# Patient Record
Sex: MALE | Race: WHITE | Employment: OTHER | URBAN - METROPOLITAN AREA
[De-identification: names, ages, dates, MRNs, and addresses within clinical notes are randomized per-mention and may not be internally consistent; named-entity substitution may affect disease eponyms.]

---

## 2017-01-06 ENCOUNTER — OFFICE VISIT (OUTPATIENT)
Dept: DERMATOLOGY CLINIC | Facility: CLINIC | Age: 72
End: 2017-01-06

## 2017-01-06 DIAGNOSIS — L82.1 OTHER SEBORRHEIC KERATOSIS: ICD-10-CM

## 2017-01-06 DIAGNOSIS — L57.0 AK (ACTINIC KERATOSIS): Primary | ICD-10-CM

## 2017-01-06 DIAGNOSIS — D23.60 BENIGN NEOPLASM OF SKIN OF UPPER LIMB, INCLUDING SHOULDER, UNSPECIFIED LATERALITY: ICD-10-CM

## 2017-01-06 DIAGNOSIS — D23.5 BENIGN NEOPLASM OF SKIN OF TRUNK, EXCEPT SCROTUM: ICD-10-CM

## 2017-01-06 DIAGNOSIS — D23.4 BENIGN NEOPLASM OF SCALP AND SKIN OF NECK: ICD-10-CM

## 2017-01-06 DIAGNOSIS — Z85.828 HISTORY OF SKIN CANCER: ICD-10-CM

## 2017-01-06 DIAGNOSIS — D23.30 BENIGN NEOPLASM OF SKIN OF FACE: ICD-10-CM

## 2017-01-06 PROCEDURE — 17000 DESTRUCT PREMALG LESION: CPT | Performed by: DERMATOLOGY

## 2017-01-06 PROCEDURE — 17003 DESTRUCT PREMALG LES 2-14: CPT | Performed by: DERMATOLOGY

## 2017-01-06 PROCEDURE — 99213 OFFICE O/P EST LOW 20 MIN: CPT | Performed by: DERMATOLOGY

## 2017-01-29 NOTE — PROGRESS NOTES
Tricia Benitez is a 80year old male. HPI:     CC:  Patient presents with:  Full Skin Exam: Pt presents today for a full body skin evaluation. Has personal history of BCC and Actinic Keratosis. LOV 08/05/15.          Allergies:  Pollen; Tramadol    HIS hydrocelectomy 2010   • Poison ivy    • Left eye injury      2015    • Skin cancer, basal cell      R chest 1995; L lat canthus 1997         Past Surgical History    COLECTOMY  2000    CHEMOTHERAPY  2000    HERNIA SURGERY         Social History   Marital S palms.     Multiple light to medium brown, well marginated, uniformly pigmented, macules and papules 6 mm and less scattered on exam. pigmented lesions examined with dermoscopy benign-appearing patterns.      Waxy tannish keratotic papules scattered, cherry

## 2017-09-18 ENCOUNTER — OFFICE VISIT (OUTPATIENT)
Dept: INTERNAL MEDICINE CLINIC | Facility: CLINIC | Age: 72
End: 2017-09-18

## 2017-09-18 VITALS
OXYGEN SATURATION: 97 % | HEIGHT: 76 IN | TEMPERATURE: 98 F | BODY MASS INDEX: 24.38 KG/M2 | SYSTOLIC BLOOD PRESSURE: 114 MMHG | HEART RATE: 65 BPM | DIASTOLIC BLOOD PRESSURE: 76 MMHG | WEIGHT: 200.19 LBS

## 2017-09-18 DIAGNOSIS — E78.1 PURE HYPERGLYCERIDEMIA: Primary | ICD-10-CM

## 2017-09-18 DIAGNOSIS — E55.9 VITAMIN D DEFICIENCY: ICD-10-CM

## 2017-09-18 DIAGNOSIS — K58.2 IRRITABLE BOWEL SYNDROME WITH BOTH CONSTIPATION AND DIARRHEA: ICD-10-CM

## 2017-09-18 DIAGNOSIS — Z12.5 SCREENING PSA (PROSTATE SPECIFIC ANTIGEN): ICD-10-CM

## 2017-09-18 DIAGNOSIS — L57.0 ACTINIC KERATOSIS: ICD-10-CM

## 2017-09-18 DIAGNOSIS — R10.13 EPIGASTRIC PAIN: ICD-10-CM

## 2017-09-18 DIAGNOSIS — C18.9 MALIGNANT NEOPLASM OF COLON, UNSPECIFIED PART OF COLON (HCC): ICD-10-CM

## 2017-09-18 DIAGNOSIS — Z00.00 PHYSICAL EXAM: ICD-10-CM

## 2017-09-18 DIAGNOSIS — Z87.19 HISTORY OF GASTRIC POLYP: ICD-10-CM

## 2017-09-18 PROCEDURE — G0463 HOSPITAL OUTPT CLINIC VISIT: HCPCS | Performed by: INTERNAL MEDICINE

## 2017-09-18 PROCEDURE — 99214 OFFICE O/P EST MOD 30 MIN: CPT | Performed by: INTERNAL MEDICINE

## 2017-09-18 RX ORDER — SIMVASTATIN 20 MG
10 TABLET ORAL NIGHTLY
Qty: 45 TABLET | Refills: 3 | Status: SHIPPED | OUTPATIENT
Start: 2017-09-18 | End: 2018-10-14

## 2017-09-18 NOTE — PROGRESS NOTES
HPI:   Ova Genre is a 71year old male who presents for complains of: Patient presents with:  Abdominal Pain: Patient complains of pain to middle of stomach. He feels it about 2-4 hours after eating. He reports he feels a lot gas.  He reports caitlin Alex Bah is a 71year old male had concerns including Abdominal Pain (Patient complains of pain to middle of stomach. He feels it about 2-4 hours after eating. He reports he feels a lot gas. He reports normal bms.  Patient reports the problem ha

## 2017-09-18 NOTE — PATIENT INSTRUCTIONS
1. Pure hyperglyceridemia  Cont meds and labs as discussed  - simvastatin 20 MG Oral Tab; Take 0.5 tablets (10 mg total) by mouth nightly. Dispense: 45 tablet; Refill: 3    2.  Malignant neoplasm of colon, unspecified part of colon ([facility]  See dr Jacklyn Styles  - Caryle Shuck

## 2017-10-04 PROBLEM — Z85.038 HISTORY OF COLON CANCER: Status: ACTIVE | Noted: 2017-10-04

## 2017-10-05 ENCOUNTER — LAB ENCOUNTER (OUTPATIENT)
Dept: LAB | Facility: HOSPITAL | Age: 72
End: 2017-10-05
Attending: INTERNAL MEDICINE
Payer: MEDICARE

## 2017-10-05 DIAGNOSIS — E55.9 VITAMIN D DEFICIENCY: ICD-10-CM

## 2017-10-05 DIAGNOSIS — Z12.5 SCREENING PSA (PROSTATE SPECIFIC ANTIGEN): ICD-10-CM

## 2017-10-05 DIAGNOSIS — Z00.00 PHYSICAL EXAM: ICD-10-CM

## 2017-10-05 PROCEDURE — 84443 ASSAY THYROID STIM HORMONE: CPT

## 2017-10-05 PROCEDURE — 81003 URINALYSIS AUTO W/O SCOPE: CPT

## 2017-10-05 PROCEDURE — 80061 LIPID PANEL: CPT

## 2017-10-05 PROCEDURE — 80053 COMPREHEN METABOLIC PANEL: CPT

## 2017-10-05 PROCEDURE — 85025 COMPLETE CBC W/AUTO DIFF WBC: CPT

## 2017-10-05 PROCEDURE — 36415 COLL VENOUS BLD VENIPUNCTURE: CPT

## 2017-10-05 PROCEDURE — 83690 ASSAY OF LIPASE: CPT

## 2017-10-05 PROCEDURE — 82306 VITAMIN D 25 HYDROXY: CPT

## 2017-10-06 ENCOUNTER — TELEPHONE (OUTPATIENT)
Dept: INTERNAL MEDICINE CLINIC | Facility: CLINIC | Age: 72
End: 2017-10-06

## 2017-10-06 NOTE — TELEPHONE ENCOUNTER
nursing call them, I left this message on mychart, try to reiterate-  jose, here's the link to your lab results, everything looks very good, your vitamin D level has come up nicely, and everything else is within normal limits. Reza/lipase numbers.   No c

## 2017-11-01 ENCOUNTER — SURGERY (OUTPATIENT)
Age: 72
End: 2017-11-01

## 2017-11-01 ENCOUNTER — HOSPITAL ENCOUNTER (OUTPATIENT)
Facility: HOSPITAL | Age: 72
Setting detail: HOSPITAL OUTPATIENT SURGERY
Discharge: HOME | End: 2017-11-01
Attending: INTERNAL MEDICINE | Admitting: INTERNAL MEDICINE
Payer: MEDICARE

## 2017-11-01 PROCEDURE — 0DJD8ZZ INSPECTION OF LOWER INTESTINAL TRACT, VIA NATURAL OR ARTIFICIAL OPENING ENDOSCOPIC: ICD-10-PCS | Performed by: INTERNAL MEDICINE

## 2017-11-01 PROCEDURE — 0DJ08ZZ INSPECTION OF UPPER INTESTINAL TRACT, VIA NATURAL OR ARTIFICIAL OPENING ENDOSCOPIC: ICD-10-PCS | Performed by: INTERNAL MEDICINE

## 2017-11-01 RX ORDER — SODIUM CHLORIDE 0.9 % (FLUSH) 0.9 %
10 SYRINGE (ML) INJECTION AS NEEDED
Status: DISCONTINUED | OUTPATIENT
Start: 2017-11-01 | End: 2017-11-01

## 2017-11-01 RX ORDER — MIDAZOLAM HYDROCHLORIDE 1 MG/ML
INJECTION INTRAMUSCULAR; INTRAVENOUS
Status: DISCONTINUED | OUTPATIENT
Start: 2017-11-01 | End: 2017-11-01

## 2017-11-01 RX ORDER — MIDAZOLAM HYDROCHLORIDE 1 MG/ML
1 INJECTION INTRAMUSCULAR; INTRAVENOUS EVERY 5 MIN PRN
Status: DISCONTINUED | OUTPATIENT
Start: 2017-11-01 | End: 2017-11-01

## 2017-11-01 RX ORDER — SODIUM CHLORIDE, SODIUM LACTATE, POTASSIUM CHLORIDE, CALCIUM CHLORIDE 600; 310; 30; 20 MG/100ML; MG/100ML; MG/100ML; MG/100ML
INJECTION, SOLUTION INTRAVENOUS CONTINUOUS
Status: DISCONTINUED | OUTPATIENT
Start: 2017-11-01 | End: 2017-11-01

## 2017-11-01 RX ORDER — SODIUM CHLORIDE 9 MG/ML
INJECTION, SOLUTION INTRAVENOUS CONTINUOUS
Status: DISCONTINUED | OUTPATIENT
Start: 2017-11-01 | End: 2017-11-01

## 2017-11-01 NOTE — OPERATIVE REPORT
[facility]    Esophagogastroduodenoscopy and Colonoscopy Report    [facility] Patient Status:  Hospital Outpatient Surgery   Date of Birth 7/28/1945 MRN [id number]   Location [facility] ENDOSCOPY LAB SUITES Attending Noel [place] is 40 cms from the incisors. The gastric mucosa including retrograde views of the cardia and fundus was normal except for the presence of a tiny sliding hiatal hernia without associated gastritis.   The pylorus duodenal bulb and descending duodenu

## 2017-11-01 NOTE — H&P
Abilio Mcghee Patient Status:  Hospital Outpatient Surgery    1945 MRN [id number]   Location [facility] ENDOSCOPY LAB SUITES Attending Jass Sloan MD   Hosp Day # 0 PCP Sneha Zepeda Glucosamine HCl-MSM (GLUCOSAMINE-MSM) 1500-500 MG/30ML Oral Liquid Take 1 tablet by mouth daily. loratadine (CLARITIN) 10 MG Oral Tab Take 1 tablet by mouth daily as needed. Multiple Vitamin (MULTI-VITAMINS) Oral Tab Take 1 tablet by mouth daily.    C

## 2017-11-02 VITALS
OXYGEN SATURATION: 94 % | HEIGHT: 77 IN | SYSTOLIC BLOOD PRESSURE: 111 MMHG | DIASTOLIC BLOOD PRESSURE: 63 MMHG | RESPIRATION RATE: 12 BRPM | HEART RATE: 69 BPM | BODY MASS INDEX: 24.21 KG/M2 | WEIGHT: 205 LBS

## 2017-11-16 DIAGNOSIS — E78.1 PURE HYPERGLYCERIDEMIA: ICD-10-CM

## 2017-11-16 RX ORDER — SIMVASTATIN 20 MG
TABLET ORAL
Qty: 45 TABLET | Refills: 0 | OUTPATIENT
Start: 2017-11-16

## 2017-12-08 ENCOUNTER — OFFICE VISIT (OUTPATIENT)
Dept: DERMATOLOGY CLINIC | Facility: CLINIC | Age: 72
End: 2017-12-08

## 2017-12-08 DIAGNOSIS — D23.4 BENIGN NEOPLASM OF SCALP AND SKIN OF NECK: ICD-10-CM

## 2017-12-08 DIAGNOSIS — L57.0 AK (ACTINIC KERATOSIS): Primary | ICD-10-CM

## 2017-12-08 DIAGNOSIS — D23.30 BENIGN NEOPLASM OF SKIN OF FACE: ICD-10-CM

## 2017-12-08 DIAGNOSIS — L82.1 OTHER SEBORRHEIC KERATOSIS: ICD-10-CM

## 2017-12-08 DIAGNOSIS — D23.5 BENIGN NEOPLASM OF SKIN OF TRUNK, EXCEPT SCROTUM: ICD-10-CM

## 2017-12-08 DIAGNOSIS — D23.60 BENIGN NEOPLASM OF SKIN OF UPPER LIMB, INCLUDING SHOULDER, UNSPECIFIED LATERALITY: ICD-10-CM

## 2017-12-08 DIAGNOSIS — Z85.828 HISTORY OF SKIN CANCER: ICD-10-CM

## 2017-12-08 PROCEDURE — G0463 HOSPITAL OUTPT CLINIC VISIT: HCPCS | Performed by: DERMATOLOGY

## 2017-12-08 PROCEDURE — 99213 OFFICE O/P EST LOW 20 MIN: CPT | Performed by: DERMATOLOGY

## 2017-12-08 RX ORDER — POLYETHYLENE GLYCOL-3350 AND ELECTROLYTES 236; 6.74; 5.86; 2.97; 22.74 G/274.31G; G/274.31G; G/274.31G; G/274.31G; G/274.31G
POWDER, FOR SOLUTION ORAL
COMMUNITY
Start: 2017-10-30 | End: 2017-12-08 | Stop reason: ALTCHOICE

## 2017-12-18 NOTE — PROGRESS NOTES
Bronson Layne is a 71year old male. HPI:     CC:  Patient presents with:  Full Skin Exam: \"Established pt\"- LOV- 1-6-17. Pt presenting today for full body skin ck pt denies skin changes at this time. Personal hx of BCC, and Ak's.  Pt denies family Tab Take 1 tablet by mouth daily. Disp:  Rfl:    Coenzyme Q10 (COQ-10) 100 MG Oral Cap Take 1 capsule by mouth daily.  Disp:  Rfl:      Allergies:     Pollen                      Comment:Seasonal allergies  Tramadol                Unknown    Past Medical Hi body skin ck pt denies skin changes at this time. Personal hx of BCC, and Ak's. Pt denies family hx of SC. Patient presents with concerns above. Patient has been in their usual state of health. History, medications, allergies reviewed as noted. previously stable presently continue to observe areas at temples, nasal dorsum      Multiple benign keratoses. Nevi unchanged. Extensive lesions no new suspicious lesions. AK's discussed maintenance Efudex therapy once every 1-2 weeks.   And as neede

## 2018-06-12 ENCOUNTER — APPOINTMENT (OUTPATIENT)
Dept: LAB | Age: 73
End: 2018-06-12
Attending: INTERNAL MEDICINE
Payer: MEDICARE

## 2018-06-12 ENCOUNTER — TELEPHONE (OUTPATIENT)
Dept: INTERNAL MEDICINE CLINIC | Facility: CLINIC | Age: 73
End: 2018-06-12

## 2018-06-12 ENCOUNTER — OFFICE VISIT (OUTPATIENT)
Dept: INTERNAL MEDICINE CLINIC | Facility: CLINIC | Age: 73
End: 2018-06-12

## 2018-06-12 VITALS
HEART RATE: 76 BPM | TEMPERATURE: 98 F | HEIGHT: 77 IN | SYSTOLIC BLOOD PRESSURE: 120 MMHG | DIASTOLIC BLOOD PRESSURE: 68 MMHG | BODY MASS INDEX: 24.21 KG/M2 | WEIGHT: 205 LBS

## 2018-06-12 DIAGNOSIS — H61.23 EXCESSIVE CERUMEN IN BOTH EAR CANALS: ICD-10-CM

## 2018-06-12 DIAGNOSIS — N30.00 ACUTE CYSTITIS WITHOUT HEMATURIA: ICD-10-CM

## 2018-06-12 DIAGNOSIS — N40.0 PROSTATE ENLARGEMENT: ICD-10-CM

## 2018-06-12 DIAGNOSIS — N39.41 URGE INCONTINENCE: ICD-10-CM

## 2018-06-12 DIAGNOSIS — R35.1 NOCTURIA: Primary | ICD-10-CM

## 2018-06-12 PROCEDURE — 99214 OFFICE O/P EST MOD 30 MIN: CPT | Performed by: INTERNAL MEDICINE

## 2018-06-12 PROCEDURE — 84153 ASSAY OF PSA TOTAL: CPT

## 2018-06-12 PROCEDURE — G0463 HOSPITAL OUTPT CLINIC VISIT: HCPCS | Performed by: INTERNAL MEDICINE

## 2018-06-12 PROCEDURE — 36415 COLL VENOUS BLD VENIPUNCTURE: CPT

## 2018-06-12 PROCEDURE — 87086 URINE CULTURE/COLONY COUNT: CPT

## 2018-06-12 PROCEDURE — 81003 URINALYSIS AUTO W/O SCOPE: CPT

## 2018-06-12 PROCEDURE — 80053 COMPREHEN METABOLIC PANEL: CPT

## 2018-06-12 RX ORDER — TAMSULOSIN HYDROCHLORIDE 0.4 MG/1
0.4 CAPSULE ORAL NIGHTLY
Qty: 30 CAPSULE | Refills: 1 | Status: SHIPPED | OUTPATIENT
Start: 2018-06-12 | End: 2019-10-24

## 2018-06-12 RX ORDER — CIPROFLOXACIN 500 MG/1
500 TABLET, FILM COATED ORAL 2 TIMES DAILY
Qty: 20 TABLET | Refills: 0 | Status: SHIPPED | OUTPATIENT
Start: 2018-06-12 | End: 2019-03-01 | Stop reason: ALTCHOICE

## 2018-06-12 NOTE — PROGRESS NOTES
HPI:   Petey Daniel is a 71year old male who presents for complains of: Patient presents with:  UTI: urinating every 2 hours at night, no bleeding or urinary differences, has had some urge incontinence  Patient for the past 6 months has had some rar Dispense: 30 capsule; Refill: 1    4. Prostate enlargement  Stable but this may need to be checked by the urologist  - PSA; Future    5.  Excessive cerumen in both ear canals  Wash/flush as discussed        Carla Lynn DO  6/12/2018  2:41 PM

## 2018-06-12 NOTE — PATIENT INSTRUCTIONS
1. Nocturia  Labs and I will call  Take the abx and the flomax    2. Acute cystitis without hematuria  Use the meds and above and call if not better  - COMP METABOLIC PANEL (14);  Future  - URINE CULTURE, ROUTINE; Future  - URINALYSIS, ROUTINE; Future  - Ci

## 2018-06-12 NOTE — TELEPHONE ENCOUNTER
Ask him if he wants to come in today, double book any of my appointments, were having a lot of skipping today.

## 2018-06-12 NOTE — TELEPHONE ENCOUNTER
Pt would like to see Dr Guero Mayen for frequent urination at night - last night was up 5 X   -Would like to be seen for this problem before he leaves to go out of town 6/27    No openings - can pt be added to Dr Garcia Hidden schedule?     Please call to advise home

## 2018-06-13 RX ORDER — TAMSULOSIN HYDROCHLORIDE 0.4 MG/1
CAPSULE ORAL
Qty: 90 CAPSULE | Refills: 1 | OUTPATIENT
Start: 2018-06-13

## 2018-06-13 NOTE — TELEPHONE ENCOUNTER
Current refill request refused due to refill is either a duplicate request or has active refills at the pharmacy. Check previous templates. Sent yesterday.

## 2018-06-15 ENCOUNTER — TELEPHONE (OUTPATIENT)
Dept: INTERNAL MEDICINE CLINIC | Facility: CLINIC | Age: 73
End: 2018-06-15

## 2018-06-15 DIAGNOSIS — R35.1 NOCTURIA: Primary | ICD-10-CM

## 2018-06-15 NOTE — TELEPHONE ENCOUNTER
Called patient and relayed DR. BARRAZA message - verbalized understanding. Number for DR. Joey Olivo given [phone] -does not need referral with medicare

## 2018-06-15 NOTE — TELEPHONE ENCOUNTER
Nursing, let patient know, I do not see any big signs of urinary tract infections, but if his prostate is involved, I would like him to remain on the 2 medications I gave him, complete the antibiotics, and stay on the Flomax, he also may need a urologist a

## 2018-07-23 ENCOUNTER — OFFICE VISIT (OUTPATIENT)
Dept: SURGERY | Facility: CLINIC | Age: 73
End: 2018-07-23
Payer: MEDICARE

## 2018-07-23 VITALS — HEIGHT: 77 IN | WEIGHT: 205 LBS | BODY MASS INDEX: 24.21 KG/M2

## 2018-07-23 DIAGNOSIS — R35.1 NOCTURIA: Primary | ICD-10-CM

## 2018-07-23 PROCEDURE — G0463 HOSPITAL OUTPT CLINIC VISIT: HCPCS | Performed by: UROLOGY

## 2018-07-23 PROCEDURE — 99204 OFFICE O/P NEW MOD 45 MIN: CPT | Performed by: UROLOGY

## 2018-07-23 NOTE — PROGRESS NOTES
SUBJECTIVE:  Gladys Tee is a 71year old male who presents for a consultation at the request of, and a copy of this note will be sent to, Dr. Montserrat Gavin, for evaluation of  benign prostatic hyperplasia and nocturia.  He states that the problem is [place] Cans of beer: 7 per week     Comment: weekly           REVIEW OF SYSTEMS:  RESPIRATORY:  Negative for chest tightness, wheezing, cough, shortness of breath,  sputum production,chest pain or pleurisy.   CARDIOVASCULAR:  Negative for pain or chest discomfort, urination symptoms. He will resume tamsulosin only if his symptoms recur. Otherwise we agreed that he would follow-up on a as needed basis. Of note, urinalysis performed June 12, 2018 was also unremarkable.     Meds This Visit:  No prescriptions requeste

## 2018-10-14 DIAGNOSIS — E78.1 PURE HYPERGLYCERIDEMIA: ICD-10-CM

## 2018-10-15 RX ORDER — SIMVASTATIN 20 MG
TABLET ORAL
Qty: 45 TABLET | Refills: 3 | Status: SHIPPED | OUTPATIENT
Start: 2018-10-15 | End: 2019-09-29

## 2019-03-01 ENCOUNTER — OFFICE VISIT (OUTPATIENT)
Dept: DERMATOLOGY CLINIC | Facility: CLINIC | Age: 74
End: 2019-03-01
Payer: MEDICARE

## 2019-03-01 DIAGNOSIS — D23.4 BENIGN NEOPLASM OF SCALP AND SKIN OF NECK: ICD-10-CM

## 2019-03-01 DIAGNOSIS — L57.0 AK (ACTINIC KERATOSIS): Primary | ICD-10-CM

## 2019-03-01 DIAGNOSIS — D23.30 BENIGN NEOPLASM OF SKIN OF FACE: ICD-10-CM

## 2019-03-01 DIAGNOSIS — D23.5 BENIGN NEOPLASM OF SKIN OF TRUNK, EXCEPT SCROTUM: ICD-10-CM

## 2019-03-01 DIAGNOSIS — L82.1 OTHER SEBORRHEIC KERATOSIS: ICD-10-CM

## 2019-03-01 DIAGNOSIS — Z85.828 HISTORY OF SKIN CANCER: ICD-10-CM

## 2019-03-01 DIAGNOSIS — D23.60 BENIGN NEOPLASM OF SKIN OF UPPER LIMB, INCLUDING SHOULDER, UNSPECIFIED LATERALITY: ICD-10-CM

## 2019-03-01 DIAGNOSIS — L72.0 EPIDERMAL CYST: ICD-10-CM

## 2019-03-01 PROCEDURE — 99213 OFFICE O/P EST LOW 20 MIN: CPT | Performed by: DERMATOLOGY

## 2019-03-01 PROCEDURE — G0463 HOSPITAL OUTPT CLINIC VISIT: HCPCS | Performed by: DERMATOLOGY

## 2019-03-01 RX ORDER — VIT A/VIT C/VIT E/ZINC/COPPER 2148-113
2 TABLET ORAL DAILY
COMMUNITY

## 2019-03-11 NOTE — PROGRESS NOTES
Chasidy Wayne is a 68year old male. HPI:     CC:  Patient presents with:  Derm Problem: Full body skin check.  New moles on left temple, chest.         Allergies:  Pollen; Tramadol    HISTORY:    Past Medical History:   Diagnosis Date   • Colon carci by mouth daily. Disp:  Rfl:    Glucosamine HCl-MSM (GLUCOSAMINE-MSM) 1500-500 MG/30ML Oral Liquid Take 1 tablet by mouth daily. Disp:  Rfl:    loratadine (CLARITIN) 10 MG Oral Tab Take 1 tablet by mouth daily as needed.  Disp:  Rfl:    Multiple Vitamin (M 6/15/1985        Years since quittin.7      Smokeless tobacco: Never Used    Substance and Sexual Activity      Alcohol use:  Yes        Alcohol/week: 3.5 oz        Types: 7 Cans of beer per week        Comment: weekly      Drug use: No      Sexual act chest.     Patient notes recently diagnosed with a \"freckle in the back of his eye\" followed by ophthalmology. Concern with lesion at right lid    Patient presents with concerns above. Patient has been in their usual state of health.   History, medica observation. Follow-up with ophthalmology for freckle on the back of the eye. Lesions of concern temple and chest benign waxy tan keratotic papules consistent with benign seborrheic keratoses reassurance. Actinic keratoses.   Precancerous nature di

## 2019-07-26 ENCOUNTER — MED REC SCAN ONLY (OUTPATIENT)
Dept: INTERNAL MEDICINE CLINIC | Facility: CLINIC | Age: 74
End: 2019-07-26

## 2019-09-13 ENCOUNTER — TELEPHONE (OUTPATIENT)
Dept: INTERNAL MEDICINE CLINIC | Facility: CLINIC | Age: 74
End: 2019-09-13

## 2019-09-13 DIAGNOSIS — E55.9 VITAMIN D DEFICIENCY: Primary | ICD-10-CM

## 2019-09-13 DIAGNOSIS — R53.83 FATIGUE, UNSPECIFIED TYPE: ICD-10-CM

## 2019-09-13 DIAGNOSIS — Z12.5 ENCOUNTER FOR SCREENING FOR MALIGNANT NEOPLASM OF PROSTATE: ICD-10-CM

## 2019-09-13 DIAGNOSIS — Z00.00 PHYSICAL EXAM: ICD-10-CM

## 2019-09-13 DIAGNOSIS — E78.5 HYPERLIPIDEMIA, UNSPECIFIED HYPERLIPIDEMIA TYPE: ICD-10-CM

## 2019-09-17 NOTE — TELEPHONE ENCOUNTER
Left message on home machine that lab orders have been placed and should be done fasting 7 days prior to MAP next month.

## 2019-09-29 DIAGNOSIS — E78.1 PURE HYPERGLYCERIDEMIA: ICD-10-CM

## 2019-09-29 RX ORDER — SIMVASTATIN 20 MG
TABLET ORAL
Qty: 45 TABLET | Refills: 0 | Status: SHIPPED | OUTPATIENT
Start: 2019-09-29 | End: 2019-12-29

## 2019-10-16 ENCOUNTER — APPOINTMENT (OUTPATIENT)
Dept: LAB | Facility: HOSPITAL | Age: 74
End: 2019-10-16
Attending: INTERNAL MEDICINE
Payer: MEDICARE

## 2019-10-16 PROCEDURE — 36415 COLL VENOUS BLD VENIPUNCTURE: CPT | Performed by: INTERNAL MEDICINE

## 2019-10-16 PROCEDURE — 81001 URINALYSIS AUTO W/SCOPE: CPT | Performed by: INTERNAL MEDICINE

## 2019-10-16 PROCEDURE — 80053 COMPREHEN METABOLIC PANEL: CPT | Performed by: INTERNAL MEDICINE

## 2019-10-16 PROCEDURE — 85025 COMPLETE CBC W/AUTO DIFF WBC: CPT | Performed by: INTERNAL MEDICINE

## 2019-10-16 PROCEDURE — 82306 VITAMIN D 25 HYDROXY: CPT | Performed by: INTERNAL MEDICINE

## 2019-10-16 PROCEDURE — 84443 ASSAY THYROID STIM HORMONE: CPT | Performed by: INTERNAL MEDICINE

## 2019-10-16 PROCEDURE — 80061 LIPID PANEL: CPT | Performed by: INTERNAL MEDICINE

## 2019-10-24 ENCOUNTER — OFFICE VISIT (OUTPATIENT)
Dept: INTERNAL MEDICINE CLINIC | Facility: CLINIC | Age: 74
End: 2019-10-24
Payer: MEDICARE

## 2019-10-24 VITALS
WEIGHT: 202.63 LBS | SYSTOLIC BLOOD PRESSURE: 120 MMHG | TEMPERATURE: 98 F | HEART RATE: 69 BPM | HEIGHT: 75.3 IN | DIASTOLIC BLOOD PRESSURE: 74 MMHG | BODY MASS INDEX: 25.19 KG/M2 | OXYGEN SATURATION: 98 %

## 2019-10-24 DIAGNOSIS — Z85.038 HISTORY OF COLON CANCER: ICD-10-CM

## 2019-10-24 DIAGNOSIS — H35.30 MACULAR DEGENERATION OF BOTH EYES, UNSPECIFIED TYPE: ICD-10-CM

## 2019-10-24 DIAGNOSIS — Z23 NEED FOR VACCINATION: ICD-10-CM

## 2019-10-24 DIAGNOSIS — N39.41 URGE INCONTINENCE: ICD-10-CM

## 2019-10-24 DIAGNOSIS — E78.1 PURE HYPERTRIGLYCERIDEMIA: ICD-10-CM

## 2019-10-24 DIAGNOSIS — L57.0 ACTINIC KERATOSIS: ICD-10-CM

## 2019-10-24 DIAGNOSIS — Z00.00 ENCOUNTER FOR ANNUAL HEALTH EXAMINATION: Primary | ICD-10-CM

## 2019-10-24 PROCEDURE — 90732 PPSV23 VACC 2 YRS+ SUBQ/IM: CPT | Performed by: INTERNAL MEDICINE

## 2019-10-24 PROCEDURE — G0009 ADMIN PNEUMOCOCCAL VACCINE: HCPCS | Performed by: INTERNAL MEDICINE

## 2019-10-24 PROCEDURE — G0463 HOSPITAL OUTPT CLINIC VISIT: HCPCS | Performed by: INTERNAL MEDICINE

## 2019-10-24 PROCEDURE — 93005 ELECTROCARDIOGRAM TRACING: CPT | Performed by: INTERNAL MEDICINE

## 2019-10-24 PROCEDURE — 82272 OCCULT BLD FECES 1-3 TESTS: CPT | Performed by: INTERNAL MEDICINE

## 2019-10-24 PROCEDURE — 99214 OFFICE O/P EST MOD 30 MIN: CPT | Performed by: INTERNAL MEDICINE

## 2019-10-24 PROCEDURE — G0439 PPPS, SUBSEQ VISIT: HCPCS | Performed by: INTERNAL MEDICINE

## 2019-10-24 PROCEDURE — 93010 ELECTROCARDIOGRAM REPORT: CPT | Performed by: INTERNAL MEDICINE

## 2019-10-24 RX ORDER — CHOLECALCIFEROL (VITAMIN D3) 25 MCG
1 TABLET ORAL DAILY
COMMUNITY

## 2019-10-24 RX ORDER — TAMSULOSIN HYDROCHLORIDE 0.4 MG/1
0.4 CAPSULE ORAL NIGHTLY
Qty: 90 CAPSULE | Refills: 3 | Status: SHIPPED | OUTPATIENT
Start: 2019-10-24 | End: 2021-02-10

## 2019-10-24 NOTE — PATIENT INSTRUCTIONS
1. Encounter for annual health examination  Stable cont monitoring and management  ECG today:     2. Urge incontinence  Stable cont monitoring and management  - tamsulosin HCl 0.4 MG Oral Cap; Take 1 capsule (0.4 mg total) by mouth nightly.   Dispense: 90 c

## 2019-10-24 NOTE — PROGRESS NOTES
Marium Lopez is a 69year old male who presents for a Medicare Annual Wellness visit. Patient presents with:  Physical: Medicare Annual, stable with walking for exercise.   diet stable balanced, weight stable, workin gas volunteer at [employer]. Assessment          Have you fallen in the last 12 months?: 0-No                                        Fall/Risk Scorin          Depression Screening (PHQ-2/PHQ-9): Over the LAST 2 WEEKS   Little interest or pleasure in doing things (over the last two Pneumococcal Orders placed or performed in visit on 10/24/19   • PNEUMOCOCCAL IMM (PNEUMOVAX)   Orders placed or performed in visit on 10/18/16   • PNEUMOCOCCAL VACC, 13 JASMEET IM    Update Immunization Activity if applicable    Hepatitis B No orders found (PRESERVISION AREDS) Oral Tab, Take 2 tablets by mouth daily. , Disp: , Rfl:   Glucosamine HCl-MSM (GLUCOSAMINE-MSM) 1500-500 MG/30ML Oral Liquid, Take 1 tablet by mouth daily. , Disp: , Rfl:   loratadine (CLARITIN) 10 MG Oral Tab, Take 1 tablet by mouth reanna gain and weight loss. ENMT: Negative for Nasal drainage and sinus pressure. Eyes: Negative for Vision changes. Respiratory: Negative for Cough, dyspnea and wheezing. Cardio: Negative Chest pain and irregular heartbeat/palpitations.   GI: Negative for Ab exam: No rales no rhonchi no wheezes  Abdominal exam: Soft nontender, nondistended positive bowel sounds are normoactive  Extremities exam: no clubbing no cyanosis no edema  Skin exam: see wound notes for details, no rashes  Neurological exam: Cranial nerv Directives    [email or web address]. org/publications/[email or web address]/personal_[email or web address]. pdf  An information packet, including necessary form from the [employer] website. http://www. [place]state. [place]/public/books/advin.htm  A link to the [place]

## 2019-11-22 ENCOUNTER — OFFICE VISIT (OUTPATIENT)
Dept: OTOLARYNGOLOGY | Facility: CLINIC | Age: 74
End: 2019-11-22
Payer: MEDICARE

## 2019-11-22 VITALS — WEIGHT: 205 LBS | HEIGHT: 75 IN | BODY MASS INDEX: 25.49 KG/M2

## 2019-11-22 DIAGNOSIS — H61.23 BILATERAL IMPACTED CERUMEN: Primary | ICD-10-CM

## 2019-11-22 PROCEDURE — 99203 OFFICE O/P NEW LOW 30 MIN: CPT | Performed by: OTOLARYNGOLOGY

## 2019-11-22 PROCEDURE — 92504 EAR MICROSCOPY EXAMINATION: CPT | Performed by: OTOLARYNGOLOGY

## 2019-11-22 PROCEDURE — G0463 HOSPITAL OUTPT CLINIC VISIT: HCPCS | Performed by: OTOLARYNGOLOGY

## 2019-11-22 NOTE — PROGRESS NOTES
Marium Lopez is a 69year old male. Patient presents with:  Ear Problem: Bilateral ear cleaning     HPI:   Ears have been blocked. He is trying to get his hearing aids adjusted but with too much wax in the ears is not been possible.   He has had no h feels well otherwise  GENERAL : denies fever, chills, sweats, weight loss, weight gain  SKIN: denies any unusual skin lesions or rashes  RESPIRATORY: denies shortness of breath with exertion  NEURO: denies headaches    EXAM:   Ht 6' 3\" (1.905 m)   Wt 205

## 2019-12-29 DIAGNOSIS — E78.1 PURE HYPERGLYCERIDEMIA: ICD-10-CM

## 2019-12-29 RX ORDER — SIMVASTATIN 20 MG
TABLET ORAL
Qty: 45 TABLET | Refills: 3 | Status: SHIPPED | OUTPATIENT
Start: 2019-12-29 | End: 2020-09-15

## 2019-12-30 ENCOUNTER — OFFICE VISIT (OUTPATIENT)
Dept: INTERNAL MEDICINE CLINIC | Facility: CLINIC | Age: 74
End: 2019-12-30
Payer: MEDICARE

## 2019-12-30 VITALS
HEIGHT: 75 IN | DIASTOLIC BLOOD PRESSURE: 66 MMHG | RESPIRATION RATE: 16 BRPM | SYSTOLIC BLOOD PRESSURE: 116 MMHG | WEIGHT: 203 LBS | TEMPERATURE: 98 F | BODY MASS INDEX: 25.24 KG/M2 | OXYGEN SATURATION: 98 % | HEART RATE: 75 BPM

## 2019-12-30 DIAGNOSIS — J02.9 SORE THROAT: Primary | ICD-10-CM

## 2019-12-30 PROCEDURE — G0463 HOSPITAL OUTPT CLINIC VISIT: HCPCS | Performed by: INTERNAL MEDICINE

## 2019-12-30 PROCEDURE — 99213 OFFICE O/P EST LOW 20 MIN: CPT | Performed by: INTERNAL MEDICINE

## 2019-12-30 RX ORDER — METHYLPREDNISOLONE 4 MG/1
TABLET ORAL
Qty: 1 KIT | Refills: 0 | Status: SHIPPED | OUTPATIENT
Start: 2019-12-30 | End: 2020-09-15 | Stop reason: ALTCHOICE

## 2019-12-30 NOTE — PROGRESS NOTES
Alex Bah is a 69year old male. Patient presents with:  Sore Throat: Patient c/o sore throat for past 1 week. Pt states he inhaled herbicide/chemical 1 week ago when symptoms started. Pt describes throat as hoarseness. Denies pain.        HPI:   Cyndra Gut 2000   • COLECTOMY  2000    colon CA   • COLONOSCOPY  03/16/2012, 12/08, 6/04, 4/03, 3/02, 2000    Dr. Danny Geller   • COLONOSCOPY N/A 11/1/2017    Performed by Fiordaliza Amaral MD at [address] [place] [address] ENDOSCOPY   • EGD  12/2008, 3/01    Dr. Danny Geller   • ESOPHAGOGASTRODUODENOSCOP

## 2020-03-02 ENCOUNTER — OFFICE VISIT (OUTPATIENT)
Dept: DERMATOLOGY CLINIC | Facility: CLINIC | Age: 75
End: 2020-03-02
Payer: MEDICARE

## 2020-03-02 DIAGNOSIS — D23.5 BENIGN NEOPLASM OF SKIN OF TRUNK, EXCEPT SCROTUM: ICD-10-CM

## 2020-03-02 DIAGNOSIS — D23.4 BENIGN NEOPLASM OF SCALP AND SKIN OF NECK: ICD-10-CM

## 2020-03-02 DIAGNOSIS — L82.1 OTHER SEBORRHEIC KERATOSIS: ICD-10-CM

## 2020-03-02 DIAGNOSIS — D22.9 MULTIPLE NEVI: ICD-10-CM

## 2020-03-02 DIAGNOSIS — D23.30 BENIGN NEOPLASM OF SKIN OF FACE: ICD-10-CM

## 2020-03-02 DIAGNOSIS — L57.0 AK (ACTINIC KERATOSIS): Primary | ICD-10-CM

## 2020-03-02 DIAGNOSIS — D23.70 BENIGN NEOPLASM OF SKIN OF LOWER LIMB, INCLUDING HIP, UNSPECIFIED LATERALITY: ICD-10-CM

## 2020-03-02 DIAGNOSIS — D23.60 BENIGN NEOPLASM OF SKIN OF UPPER LIMB, INCLUDING SHOULDER, UNSPECIFIED LATERALITY: ICD-10-CM

## 2020-03-02 PROCEDURE — 17003 DESTRUCT PREMALG LES 2-14: CPT | Performed by: DERMATOLOGY

## 2020-03-02 PROCEDURE — 99213 OFFICE O/P EST LOW 20 MIN: CPT | Performed by: DERMATOLOGY

## 2020-03-02 PROCEDURE — 17000 DESTRUCT PREMALG LESION: CPT | Performed by: DERMATOLOGY

## 2020-03-02 PROCEDURE — G0463 HOSPITAL OUTPT CLINIC VISIT: HCPCS | Performed by: DERMATOLOGY

## 2020-03-15 NOTE — PROGRESS NOTES
Lauren Bucio is a 69year old male. HPI:     CC:  Patient presents with:  Full Skin Exam: LOV 3/1/2019. Pt requesting full skin exam. Denies any changing moles. Denies family hx of skin ca. Personal hx of BCC and AKs.         Allergies:  Pollen; Tram capsule (0.4 mg total) by mouth nightly. 90 capsule 3   • Multiple Vitamins-Minerals (PRESERVISION AREDS) Oral Tab Take 2 tablets by mouth daily. • Glucosamine HCl-MSM (GLUCOSAMINE-MSM) 1500-500 MG/30ML Oral Liquid Take 2 tablets by mouth daily. Years: 15.00        Pack years: 15        Types: Cigarettes        Quit date: 6/15/1985        Years since quittin.7      Smokeless tobacco: Never Used    Substance and Sexual Activity      Alcohol use: Yes        Frequency: 4 or more times a week visit.    HPI:    Patient presents with:  Full Skin Exam: LOV 3/1/2019. Pt requesting full skin exam. Denies any changing moles. Denies family hx of skin ca. Personal hx of BCC and AKs.     Patient notes previously diagnosed with a \"freckle in the back of prescriptions requested or ordered in this encounter         Ak (actinic keratosis)  (primary encounter diagnosis)  Other seborrheic keratosis  Multiple nevi  Benign neoplasm of scalp and skin of neck  Benign neoplasm of skin of face  Benign neoplasm of sk todays  exam.    Benign nevi, seborrheic  keratoses, cherry angiomas:  Reassurance regarding other benign skin lesions. Signs and symptoms of skin cancer, ABCDE's of melanoma discussed with patient. Sunscreen use, sun protection, self exams reviewed.   Kit Mcintyre

## 2020-03-20 ENCOUNTER — TELEPHONE (OUTPATIENT)
Dept: INTERNAL MEDICINE CLINIC | Facility: CLINIC | Age: 75
End: 2020-03-20

## 2020-03-20 NOTE — TELEPHONE ENCOUNTER
Pt like to get tested for Cov19. Pt have symptoms of raspy throat for a week lest night he did not sleep he was concern he had a shortness of breath. Pt felt worm lest night but don't think he has a fever. Pt was in [place] lest week he flew back.

## 2020-03-20 NOTE — TELEPHONE ENCOUNTER
Spoke with patient, gave him some reassurance, without fevers, that he should treat for upper respiratory type allergy symptoms, and keep monitoring. At this point I do not see any reason for him to be isolated, but he can use his discretion, verbalized understanding. Also had a lengthy conversation about the progress and of course, and if needed, to present to the hospital with any legit symptoms that progress. He verbalized understanding.

## 2020-03-20 NOTE — TELEPHONE ENCOUNTER
Please advise - patient called back - he states he felt SOB last night , denies cough , denies fever temp is 97.1 , he was in [id number] [facility] and came back last week, so unsure if he was exposed to someone who tested positive for COVID19- to DR. BARRAZA

## 2020-08-23 ENCOUNTER — TELEPHONE (OUTPATIENT)
Dept: SCHEDULING | Age: 75
End: 2020-08-23

## 2020-08-24 ENCOUNTER — APPOINTMENT (OUTPATIENT)
Dept: URGENT CARE | Age: 75
End: 2020-08-24

## 2020-09-04 ENCOUNTER — APPOINTMENT (OUTPATIENT)
Dept: GENERAL RADIOLOGY | Facility: HOSPITAL | Age: 75
End: 2020-09-04
Attending: EMERGENCY MEDICINE
Payer: MEDICARE

## 2020-09-04 ENCOUNTER — TELEPHONE (OUTPATIENT)
Dept: INTERNAL MEDICINE CLINIC | Facility: CLINIC | Age: 75
End: 2020-09-04

## 2020-09-04 ENCOUNTER — APPOINTMENT (OUTPATIENT)
Dept: CT IMAGING | Facility: HOSPITAL | Age: 75
End: 2020-09-04
Attending: EMERGENCY MEDICINE
Payer: MEDICARE

## 2020-09-04 ENCOUNTER — HOSPITAL ENCOUNTER (EMERGENCY)
Facility: HOSPITAL | Age: 75
Discharge: HOME | End: 2020-09-04
Attending: EMERGENCY MEDICINE
Payer: MEDICARE

## 2020-09-04 VITALS
WEIGHT: 203.06 LBS | BODY MASS INDEX: 25 KG/M2 | RESPIRATION RATE: 14 BRPM | TEMPERATURE: 98 F | SYSTOLIC BLOOD PRESSURE: 119 MMHG | HEART RATE: 64 BPM | DIASTOLIC BLOOD PRESSURE: 73 MMHG | OXYGEN SATURATION: 98 %

## 2020-09-04 DIAGNOSIS — R42 EPISODE OF DIZZINESS: Primary | ICD-10-CM

## 2020-09-04 DIAGNOSIS — I95.89 HYPOTENSION DUE TO HYPOVOLEMIA: ICD-10-CM

## 2020-09-04 DIAGNOSIS — E86.1 HYPOTENSION DUE TO HYPOVOLEMIA: ICD-10-CM

## 2020-09-04 LAB
ANION GAP SERPL CALC-SCNC: 6 MMOL/L (ref 0–18)
BACTERIA UR QL AUTO: NEGATIVE /HPF
BASOPHILS # BLD AUTO: 0.07 X10(3) UL (ref 0–0.2)
BASOPHILS NFR BLD AUTO: 0.7 %
BILIRUB UR QL: NEGATIVE
BUN BLD-MCNC: 20 MG/DL (ref 7–18)
BUN/CREAT SERPL: 15.4 (ref 10–20)
CALCIUM BLD-MCNC: 9 MG/DL (ref 8.5–10.1)
CHLORIDE SERPL-SCNC: 109 MMOL/L (ref 98–112)
CLARITY UR: CLEAR
CO2 SERPL-SCNC: 28 MMOL/L (ref 21–32)
COLOR UR: YELLOW
CREAT BLD-MCNC: 1.3 MG/DL (ref 0.7–1.3)
DEPRECATED RDW RBC AUTO: 46.3 FL (ref 35.1–46.3)
EOSINOPHIL # BLD AUTO: 0.27 X10(3) UL (ref 0–0.7)
EOSINOPHIL NFR BLD AUTO: 2.8 %
ERYTHROCYTE [DISTWIDTH] IN BLOOD BY AUTOMATED COUNT: 12.8 % (ref 11–15)
GLUCOSE BLD-MCNC: 116 MG/DL (ref 70–99)
GLUCOSE UR-MCNC: 50 MG/DL
HCT VFR BLD AUTO: 43.3 % (ref 39–53)
HGB BLD-MCNC: 14.7 G/DL (ref 13–17.5)
HGB UR QL STRIP.AUTO: NEGATIVE
IMM GRANULOCYTES # BLD AUTO: 0.03 X10(3) UL (ref 0–1)
IMM GRANULOCYTES NFR BLD: 0.3 %
KETONES UR-MCNC: NEGATIVE MG/DL
LEUKOCYTE ESTERASE UR QL STRIP.AUTO: NEGATIVE
LYMPHOCYTES # BLD AUTO: 3.39 X10(3) UL (ref 1–4)
LYMPHOCYTES NFR BLD AUTO: 34.9 %
MCH RBC QN AUTO: 33.6 PG (ref 26–34)
MCHC RBC AUTO-ENTMCNC: 33.9 G/DL (ref 31–37)
MCV RBC AUTO: 98.9 FL (ref 80–100)
MONOCYTES # BLD AUTO: 0.78 X10(3) UL (ref 0.1–1)
MONOCYTES NFR BLD AUTO: 8 %
NEUTROPHILS # BLD AUTO: 5.17 X10 (3) UL (ref 1.5–7.7)
NEUTROPHILS # BLD AUTO: 5.17 X10(3) UL (ref 1.5–7.7)
NEUTROPHILS NFR BLD AUTO: 53.3 %
NITRITE UR QL STRIP.AUTO: NEGATIVE
OSMOLALITY SERPL CALC.SUM OF ELEC: 300 MOSM/KG (ref 275–295)
PH UR: 6 [PH] (ref 5–8)
PLATELET # BLD AUTO: 347 10(3)UL (ref 150–450)
POTASSIUM SERPL-SCNC: 3.6 MMOL/L (ref 3.5–5.1)
PROT UR-MCNC: NEGATIVE MG/DL
RBC # BLD AUTO: 4.38 X10(6)UL (ref 3.8–5.8)
RBC #/AREA URNS AUTO: 1 /HPF
SODIUM SERPL-SCNC: 143 MMOL/L (ref 136–145)
SP GR UR STRIP: 1.03 (ref 1–1.03)
TROPONIN I SERPL-MCNC: <0.045 NG/ML (ref ?–0.04)
UROBILINOGEN UR STRIP-ACNC: <2
WBC # BLD AUTO: 9.7 X10(3) UL (ref 4–11)
WBC #/AREA URNS AUTO: 1 /HPF

## 2020-09-04 PROCEDURE — 96374 THER/PROPH/DIAG INJ IV PUSH: CPT

## 2020-09-04 PROCEDURE — 71045 X-RAY EXAM CHEST 1 VIEW: CPT | Performed by: EMERGENCY MEDICINE

## 2020-09-04 PROCEDURE — 84484 ASSAY OF TROPONIN QUANT: CPT | Performed by: EMERGENCY MEDICINE

## 2020-09-04 PROCEDURE — 93005 ELECTROCARDIOGRAM TRACING: CPT

## 2020-09-04 PROCEDURE — 96361 HYDRATE IV INFUSION ADD-ON: CPT

## 2020-09-04 PROCEDURE — 80048 BASIC METABOLIC PNL TOTAL CA: CPT | Performed by: EMERGENCY MEDICINE

## 2020-09-04 PROCEDURE — 81001 URINALYSIS AUTO W/SCOPE: CPT | Performed by: EMERGENCY MEDICINE

## 2020-09-04 PROCEDURE — 99285 EMERGENCY DEPT VISIT HI MDM: CPT

## 2020-09-04 PROCEDURE — 70498 CT ANGIOGRAPHY NECK: CPT | Performed by: EMERGENCY MEDICINE

## 2020-09-04 PROCEDURE — 85025 COMPLETE CBC W/AUTO DIFF WBC: CPT | Performed by: EMERGENCY MEDICINE

## 2020-09-04 PROCEDURE — 93010 ELECTROCARDIOGRAM REPORT: CPT | Performed by: EMERGENCY MEDICINE

## 2020-09-04 PROCEDURE — 70496 CT ANGIOGRAPHY HEAD: CPT | Performed by: EMERGENCY MEDICINE

## 2020-09-04 RX ORDER — ONDANSETRON 2 MG/ML
4 INJECTION INTRAMUSCULAR; INTRAVENOUS ONCE
Status: COMPLETED | OUTPATIENT
Start: 2020-09-04 | End: 2020-09-04

## 2020-09-04 NOTE — TELEPHONE ENCOUNTER
Spoke to patient and relayed MD message. Patient verbalized understanding. Pt kept appt for 9/15--states he will call our office if he is not feeling well sooner.

## 2020-09-04 NOTE — ED PROVIDER NOTES
Patient Seen in: [facility] Emergency Department      History   Patient presents with:  Dizziness    Stated Complaint:     HPI    77year old male with h/o high cholesterol, colon cancer in remission, migraines, BPH who presents with episodes of d Performed by Renny Tanner MD at [address] [place] [address] ENDOSCOPY   • HERNIA SURGERY                      Social History    Tobacco Use      Smoking status: Former Smoker        Packs/day: 1.00        Years: 15.00        Pack years: 15        Types: Cigarettes        Quit General: No tenderness. Skin:     General: Skin is warm and dry. Coloration: Skin is not pale. Findings: No erythema. Neurological:      Mental Status: He is alert and oriented to person, place, and time. He is not disoriented.       GCS: G Pulse Ox: 98%, Normal, RA    Cardiac Monitor: Pulse Readings from Last 1 Encounters:  09/04/20 : 64  , sinus, normal for rate and rhythm     Radiology findings:     Xr Chest Ap Portable  (cpt=71045)    Result Date: 9/4/2020  CONCLUSION: No acute cardiopulm Pt feeling much better after IVF, his bp was low and possibly dehydration/hypovolemia as improved significantly with fluids. CTA of head/neck obtained to r/o dissection, thrombus, bleed given neck pain at onset.  D/w Dr Jean Carlos Alvarez who agrees that given prese

## 2020-09-04 NOTE — ED INITIAL ASSESSMENT (HPI)
C/o dizziness like he was going to pass out while he was outside, went inside and sat down, per ems, wife stated he did pass out for approx 10 sec and was lowered to the floor. Pt states lightheaded/dizziness is gone but feels nauseated.    Denies cp/sob/b

## 2020-09-15 ENCOUNTER — OFFICE VISIT (OUTPATIENT)
Dept: INTERNAL MEDICINE CLINIC | Facility: CLINIC | Age: 75
End: 2020-09-15
Payer: MEDICARE

## 2020-09-15 VITALS
HEART RATE: 77 BPM | WEIGHT: 195.63 LBS | SYSTOLIC BLOOD PRESSURE: 118 MMHG | DIASTOLIC BLOOD PRESSURE: 76 MMHG | TEMPERATURE: 98 F | HEIGHT: 75 IN | BODY MASS INDEX: 24.32 KG/M2 | OXYGEN SATURATION: 98 %

## 2020-09-15 DIAGNOSIS — R09.82 POST-NASAL DRIP: ICD-10-CM

## 2020-09-15 DIAGNOSIS — Z12.5 SCREENING PSA (PROSTATE SPECIFIC ANTIGEN): ICD-10-CM

## 2020-09-15 DIAGNOSIS — E78.1 PURE HYPERGLYCERIDEMIA: ICD-10-CM

## 2020-09-15 DIAGNOSIS — Z00.00 PHYSICAL EXAM: ICD-10-CM

## 2020-09-15 DIAGNOSIS — R73.9 BLOOD GLUCOSE ELEVATED: ICD-10-CM

## 2020-09-15 DIAGNOSIS — R55 PRE-SYNCOPE: Primary | ICD-10-CM

## 2020-09-15 DIAGNOSIS — E55.9 VITAMIN D DEFICIENCY: ICD-10-CM

## 2020-09-15 DIAGNOSIS — I95.1 ORTHOSTATIC HYPOTENSION: ICD-10-CM

## 2020-09-15 PROCEDURE — 99214 OFFICE O/P EST MOD 30 MIN: CPT | Performed by: INTERNAL MEDICINE

## 2020-09-15 PROCEDURE — G0463 HOSPITAL OUTPT CLINIC VISIT: HCPCS | Performed by: INTERNAL MEDICINE

## 2020-09-15 RX ORDER — OMEPRAZOLE 20 MG/1
20 CAPSULE, DELAYED RELEASE ORAL
Qty: 60 CAPSULE | Refills: 0 | Status: SHIPPED | OUTPATIENT
Start: 2020-09-15 | End: 2021-03-08

## 2020-09-15 RX ORDER — FLUTICASONE PROPIONATE 50 MCG
2 SPRAY, SUSPENSION (ML) NASAL 2 TIMES DAILY
Qty: 2 BOTTLE | Refills: 1 | Status: SHIPPED | OUTPATIENT
Start: 2020-09-15 | End: 2021-11-15

## 2020-09-15 RX ORDER — SIMVASTATIN 20 MG
10 TABLET ORAL NIGHTLY
Qty: 45 TABLET | Refills: 3 | Status: SHIPPED | OUTPATIENT
Start: 2020-09-15 | End: 2021-11-15

## 2020-09-15 NOTE — PATIENT INSTRUCTIONS
1. Pre-syncope  Stable, multifactorial, orthostatic, see below    2. Pure hyperglyceridemia  Medications refilled  - simvastatin 20 MG Oral Tab; Take 0.5 tablets (10 mg total) by mouth nightly. Dispense: 45 tablet; Refill: 3    3.  Orthostatic hypotension if your symptoms resolve, or when they return, it will be based on withdrawal of 1 of these 2 medications  The Claritin is something you should be consistent with for the symptoms as well  - omeprazole 20 MG Oral Capsule Delayed Release;  Take 1 capsule (20

## 2020-09-15 NOTE — PROGRESS NOTES
HPI:   Yazmin Vicente is a 77year old male who presents for complains of: Patient presents with:  Er F/u: ER f/u appt. Pt reports feeling light headed in AM, denies any syncopal episodes. Declines flu shot at this time.   Medication Request: Pened refi simvastatin 20 MG Oral Tab; Take 0.5 tablets (10 mg total) by mouth nightly. Dispense: 45 tablet; Refill: 3    3.  Orthostatic hypotension  For the low blood pressure states and/or occasional dizziness, I do need to be notified if you are frequently having Claritin is something you should be consistent with for the symptoms as well  - omeprazole 20 MG Oral Capsule Delayed Release; Take 1 capsule (20 mg total) by mouth 2 (two) times daily before meals. Or as directed  Dispense: 60 capsule;  Refill: 0  - Flutic

## 2020-09-16 RX ORDER — OMEPRAZOLE 20 MG/1
CAPSULE, DELAYED RELEASE ORAL
Qty: 180 CAPSULE | Refills: 0 | OUTPATIENT
Start: 2020-09-16

## 2020-09-16 NOTE — TELEPHONE ENCOUNTER
Current refill request refused due to refill is either a duplicate request or has active refills at the pharmacy. Check previous templates.     Requested Prescriptions     Refused Prescriptions Disp Refills   • OMEPRAZOLE 20 MG Oral Capsule Delayed Release

## 2020-10-06 ENCOUNTER — LAB ENCOUNTER (OUTPATIENT)
Dept: LAB | Age: 75
End: 2020-10-06
Attending: INTERNAL MEDICINE
Payer: MEDICARE

## 2020-10-06 DIAGNOSIS — Z00.00 PHYSICAL EXAM: ICD-10-CM

## 2020-10-06 DIAGNOSIS — E55.9 VITAMIN D DEFICIENCY: ICD-10-CM

## 2020-10-06 PROCEDURE — 85025 COMPLETE CBC W/AUTO DIFF WBC: CPT

## 2020-10-06 PROCEDURE — 36415 COLL VENOUS BLD VENIPUNCTURE: CPT

## 2020-10-06 PROCEDURE — 82043 UR ALBUMIN QUANTITATIVE: CPT

## 2020-10-06 PROCEDURE — 84443 ASSAY THYROID STIM HORMONE: CPT

## 2020-10-06 PROCEDURE — 82306 VITAMIN D 25 HYDROXY: CPT

## 2020-10-06 PROCEDURE — 82570 ASSAY OF URINE CREATININE: CPT

## 2020-10-06 PROCEDURE — 83036 HEMOGLOBIN GLYCOSYLATED A1C: CPT

## 2020-10-06 PROCEDURE — 80061 LIPID PANEL: CPT

## 2020-10-06 PROCEDURE — 81003 URINALYSIS AUTO W/O SCOPE: CPT | Performed by: INTERNAL MEDICINE

## 2020-10-06 PROCEDURE — 80053 COMPREHEN METABOLIC PANEL: CPT

## 2020-10-23 ENCOUNTER — LAB ENCOUNTER (OUTPATIENT)
Dept: LAB | Age: 75
End: 2020-10-23
Attending: INTERNAL MEDICINE
Payer: MEDICARE

## 2020-10-23 DIAGNOSIS — Z12.5 SCREENING PSA (PROSTATE SPECIFIC ANTIGEN): ICD-10-CM

## 2020-10-23 PROCEDURE — 36415 COLL VENOUS BLD VENIPUNCTURE: CPT

## 2020-12-21 ENCOUNTER — IMMUNIZATION (OUTPATIENT)
Dept: LAB | Facility: HOSPITAL | Age: 75
End: 2020-12-21
Attending: PREVENTIVE MEDICINE
Payer: MEDICARE

## 2020-12-21 DIAGNOSIS — Z23 NEED FOR VACCINATION: ICD-10-CM

## 2020-12-21 PROCEDURE — 0001A PFIZER-BIONTECH COVID-19 VACCINE: CPT

## 2021-01-11 ENCOUNTER — IMMUNIZATION (OUTPATIENT)
Dept: LAB | Facility: HOSPITAL | Age: 76
End: 2021-01-11
Attending: PREVENTIVE MEDICINE
Payer: MEDICARE

## 2021-01-11 DIAGNOSIS — Z23 NEED FOR VACCINATION: Primary | ICD-10-CM

## 2021-01-11 PROCEDURE — 0002A SARSCOV2 VAC 30MCG/0.3ML IM: CPT

## 2021-02-10 DIAGNOSIS — E78.1 PURE HYPERGLYCERIDEMIA: ICD-10-CM

## 2021-02-10 DIAGNOSIS — N39.41 URGE INCONTINENCE: ICD-10-CM

## 2021-02-10 RX ORDER — SIMVASTATIN 20 MG
10 TABLET ORAL NIGHTLY
Qty: 45 TABLET | Refills: 3 | Status: CANCELLED | OUTPATIENT
Start: 2021-02-10

## 2021-02-12 DIAGNOSIS — N39.41 URGE INCONTINENCE: ICD-10-CM

## 2021-02-12 RX ORDER — TAMSULOSIN HYDROCHLORIDE 0.4 MG/1
0.4 CAPSULE ORAL NIGHTLY
Qty: 90 CAPSULE | Refills: 3 | Status: SHIPPED | OUTPATIENT
Start: 2021-02-12 | End: 2021-11-15

## 2021-02-15 RX ORDER — TAMSULOSIN HYDROCHLORIDE 0.4 MG/1
CAPSULE ORAL
Qty: 90 CAPSULE | Refills: 3 | OUTPATIENT
Start: 2021-02-15

## 2021-02-15 NOTE — TELEPHONE ENCOUNTER
Current refill request refused due to refill is either a duplicate request or has active refills at the pharmacy. Check previous templates.     Requested Prescriptions     Refused Prescriptions Disp Refills   • [id number] cap [Pharmacy Med Name: Cammie Erazo

## 2021-03-08 ENCOUNTER — OFFICE VISIT (OUTPATIENT)
Dept: DERMATOLOGY CLINIC | Facility: CLINIC | Age: 76
End: 2021-03-08
Payer: MEDICARE

## 2021-03-08 DIAGNOSIS — D48.5 NEOPLASM OF UNCERTAIN BEHAVIOR OF SKIN: Primary | ICD-10-CM

## 2021-03-08 DIAGNOSIS — L57.0 AK (ACTINIC KERATOSIS): ICD-10-CM

## 2021-03-08 DIAGNOSIS — L82.1 OTHER SEBORRHEIC KERATOSIS: ICD-10-CM

## 2021-03-08 DIAGNOSIS — D23.4 BENIGN NEOPLASM OF SCALP AND SKIN OF NECK: ICD-10-CM

## 2021-03-08 DIAGNOSIS — D23.60 BENIGN NEOPLASM OF SKIN OF UPPER LIMB, INCLUDING SHOULDER, UNSPECIFIED LATERALITY: ICD-10-CM

## 2021-03-08 DIAGNOSIS — D22.9 MULTIPLE NEVI: ICD-10-CM

## 2021-03-08 DIAGNOSIS — D23.30 BENIGN NEOPLASM OF SKIN OF FACE: ICD-10-CM

## 2021-03-08 PROCEDURE — 99213 OFFICE O/P EST LOW 20 MIN: CPT | Performed by: DERMATOLOGY

## 2021-03-08 PROCEDURE — 11102 TANGNTL BX SKIN SINGLE LES: CPT | Performed by: DERMATOLOGY

## 2021-03-08 PROCEDURE — 88305 TISSUE EXAM BY PATHOLOGIST: CPT | Performed by: DERMATOLOGY

## 2021-03-08 PROCEDURE — 17000 DESTRUCT PREMALG LESION: CPT | Performed by: DERMATOLOGY

## 2021-03-08 RX ORDER — A/SINGAPORE/GP1908/2015 IVR-180 (AN A/MICHIGAN/45/2015 (H1N1)PDM09-LIKE VIRUS, A/HONG KONG/4801/2014, NYMC X-263B (H3N2) (AN A/HONG KONG/4801/2014-LIKE VIRUS), AND B/BRISBANE/60/2008, WILD TYPE (A B/BRISBANE/60/2008-LIKE VIRUS) 15; 15; 15 UG/.5ML; UG/.5ML; UG/.5ML
0.5 INJECTION, SUSPENSION INTRAMUSCULAR SEE ADMIN INSTRUCTIONS
COMMUNITY
Start: 2020-09-16 | End: 2021-11-15 | Stop reason: ALTCHOICE

## 2021-03-12 NOTE — PROGRESS NOTES
The pathology report from last visit showed  left lateral cheek BCC, would recommend MOHS with Dr. Katie James in this area. Please log in test results. Please call patient and inform of results and recommendations.  (please add to history).   Pt to  rtc for

## 2021-03-12 NOTE — PROGRESS NOTES
Logged in path book and pmh. Pt informed of pathology results and KMT's recommendations for treatment with Dr. Milton Lou. Pt verbalized understanding and agreeable to treatment. Pt provided with contact information for Dr. Milton Lou.   Pt's pathology report

## 2021-03-21 NOTE — PROGRESS NOTES
Marium Lopez is a 77year old male. HPI:     CC:  Patient presents with:  Full Skin Exam: LOV 3/2020. Pt requesting full skin exam. Concerned with lesion that keeps developing a scab x11 months. Denies family hx of skin ca.  Personal hx of BCC and AK Each Nare route 2 (two) times daily. 2 Bottle 1   • Cholecalciferol (VITAMIN D3) 25 MCG Oral Tab Take 1 tablet by mouth daily. • Multiple Vitamins-Minerals (PRESERVISION AREDS) Oral Tab Take 2 tablets by mouth daily.      • Glucosamine HCl-MSM (GLUCOSAM Not on file    Tobacco Use      Smoking status: Former Smoker        Packs/day: 1.00        Years: 15.00        Pack years: 15        Types: Cigarettes        Quit date: 6/15/1985        Years since quittin.7      Smokeless tobacco: Never Used    Vapi Ex-Partner:       Emotionally Abused:       Physically Abused:       Sexually Abused:   Family History   Problem Relation Age of Onset   • Stroke Father [de-identified]   • Breast Cancer Mother 66        transitioned to ovarian        There were no vitals filed for [facility] [place] lesions noted . Otherwise remarkable for lesions as noted on map.   See Assessment /Plan for additional history and physical exam also:    Assessment / plan:    Orders Placed This Encounter      Specimen to Pathology, Tissue [IHP Pt to Reji course of Efudex to forehead temples nasal dorsum after the holidays continue careful sun protection. Continue Efudex    Please refer to map for specific lesions. See additional diagnoses. Pros cons of various therapies, risks benefits discussed. Pathoph

## 2021-04-13 ENCOUNTER — MED REC SCAN ONLY (OUTPATIENT)
Dept: DERMATOLOGY CLINIC | Facility: CLINIC | Age: 76
End: 2021-04-13

## 2021-07-14 ENCOUNTER — OFFICE VISIT (OUTPATIENT)
Dept: OTOLARYNGOLOGY | Facility: CLINIC | Age: 76
End: 2021-07-14
Payer: MEDICARE

## 2021-07-14 VITALS
WEIGHT: 205 LBS | BODY MASS INDEX: 24.96 KG/M2 | TEMPERATURE: 97 F | DIASTOLIC BLOOD PRESSURE: 79 MMHG | HEIGHT: 76 IN | SYSTOLIC BLOOD PRESSURE: 129 MMHG

## 2021-07-14 DIAGNOSIS — H61.23 BILATERAL IMPACTED CERUMEN: Primary | ICD-10-CM

## 2021-07-14 PROCEDURE — 99213 OFFICE O/P EST LOW 20 MIN: CPT | Performed by: OTOLARYNGOLOGY

## 2021-07-14 NOTE — PROGRESS NOTES
Amanda Case is a 77year old male. Patient presents with:  Ear Wax: pt presents today due to both ear wax , needs cleaning. HISTORY OF PRESENT ILLNESS  7/14/2021   Here for evaluation of   hearing loss.  Patient feels this has worsened over t Living Expenses:   Food Insecurity:       Worried About [address] in the Last Year:       Ran Out of Food in the Last Year:   Transportation Needs:       Lack of Transportation (Medical):       Lack of Transportation (Non-Medical):   Physical Act Cardio Negative Chest pain, irregular heartbeat   GI Negative Abdominal pain and diarrhea. Endocrine Negative Cold intolerance and heat intolerance. Neuro Negative Tremors.    Psych Negative Behavioral issues   Integumentary Negative Frequent skin inf 50 MCG/ACT Nasal Suspension, 2 sprays by Each Nare route 2 (two) times daily. , Disp: 2 Bottle, Rfl: 1  •  Cholecalciferol (VITAMIN D3) 25 MCG Oral Tab, Take 1 tablet by mouth daily. , Disp: , Rfl:   •  Multiple Vitamins-Minerals (PRESERVISION AREDS) Oral Ta

## 2021-10-29 DIAGNOSIS — E78.1 PURE HYPERGLYCERIDEMIA: ICD-10-CM

## 2021-10-29 RX ORDER — SIMVASTATIN 20 MG
TABLET ORAL
Qty: 45 TABLET | Refills: 3 | OUTPATIENT
Start: 2021-10-29

## 2021-11-03 ENCOUNTER — PATIENT MESSAGE (OUTPATIENT)
Dept: INTERNAL MEDICINE CLINIC | Facility: CLINIC | Age: 76
End: 2021-11-03

## 2021-11-03 ENCOUNTER — OFFICE VISIT (OUTPATIENT)
Dept: DERMATOLOGY CLINIC | Facility: CLINIC | Age: 76
End: 2021-11-03
Payer: MEDICARE

## 2021-11-03 DIAGNOSIS — L57.0 AK (ACTINIC KERATOSIS): ICD-10-CM

## 2021-11-03 DIAGNOSIS — Z85.828 HISTORY OF NONMELANOMA SKIN CANCER: ICD-10-CM

## 2021-11-03 DIAGNOSIS — D22.9 MULTIPLE NEVI: ICD-10-CM

## 2021-11-03 DIAGNOSIS — E78.5 HYPERLIPIDEMIA, UNSPECIFIED HYPERLIPIDEMIA TYPE: ICD-10-CM

## 2021-11-03 DIAGNOSIS — D23.9 BENIGN NEOPLASM OF SKIN, UNSPECIFIED LOCATION: ICD-10-CM

## 2021-11-03 DIAGNOSIS — L82.1 OTHER SEBORRHEIC KERATOSIS: ICD-10-CM

## 2021-11-03 DIAGNOSIS — D48.5 NEOPLASM OF UNCERTAIN BEHAVIOR OF SKIN: Primary | ICD-10-CM

## 2021-11-03 DIAGNOSIS — E55.9 VITAMIN D DEFICIENCY: ICD-10-CM

## 2021-11-03 DIAGNOSIS — Z12.5 ENCOUNTER FOR PROSTATE CANCER SCREENING: Primary | ICD-10-CM

## 2021-11-03 PROCEDURE — 99213 OFFICE O/P EST LOW 20 MIN: CPT | Performed by: DERMATOLOGY

## 2021-11-03 PROCEDURE — 88305 TISSUE EXAM BY PATHOLOGIST: CPT | Performed by: DERMATOLOGY

## 2021-11-03 PROCEDURE — 11102 TANGNTL BX SKIN SINGLE LES: CPT | Performed by: DERMATOLOGY

## 2021-11-03 PROCEDURE — 11103 TANGNTL BX SKIN EA SEP/ADDL: CPT | Performed by: DERMATOLOGY

## 2021-11-03 NOTE — TELEPHONE ENCOUNTER
From: Enedina Sanchez  To: Rena Mcguire DO  Sent: 11/3/2021 9:35 AM CDT  Subject: Lipid Panel    I have an appt with you on 11/15/21. Do you want to order blood work prior to that visit?   Nidia Yanez

## 2021-11-05 NOTE — TELEPHONE ENCOUNTER
Yes, Lab order completed, nursing please call patient, let them know to complete the labs 12 hours fasting, to be done 7 days prior to their visit of possible

## 2021-11-10 ENCOUNTER — LAB ENCOUNTER (OUTPATIENT)
Dept: LAB | Age: 76
End: 2021-11-10
Attending: INTERNAL MEDICINE
Payer: MEDICARE

## 2021-11-10 DIAGNOSIS — E55.9 VITAMIN D DEFICIENCY: ICD-10-CM

## 2021-11-10 DIAGNOSIS — Z12.5 ENCOUNTER FOR PROSTATE CANCER SCREENING: ICD-10-CM

## 2021-11-10 DIAGNOSIS — E78.5 HYPERLIPIDEMIA, UNSPECIFIED HYPERLIPIDEMIA TYPE: ICD-10-CM

## 2021-11-10 PROCEDURE — 85025 COMPLETE CBC W/AUTO DIFF WBC: CPT

## 2021-11-10 PROCEDURE — 81001 URINALYSIS AUTO W/SCOPE: CPT

## 2021-11-10 PROCEDURE — 84443 ASSAY THYROID STIM HORMONE: CPT

## 2021-11-10 PROCEDURE — 36415 COLL VENOUS BLD VENIPUNCTURE: CPT

## 2021-11-10 PROCEDURE — 80053 COMPREHEN METABOLIC PANEL: CPT

## 2021-11-10 PROCEDURE — 82306 VITAMIN D 25 HYDROXY: CPT

## 2021-11-10 PROCEDURE — 80061 LIPID PANEL: CPT

## 2021-11-10 NOTE — PROGRESS NOTES
The pathology report from last visit showed nodular BCC from right temple this is rather ill-defined. I would normally recommend Mohs surgery for this area.   He had seen Dr. Idalmis Padgett previously has some concerns regarding incision on the left cheek from Mo

## 2021-11-11 NOTE — PROGRESS NOTES
Logged in path book and pmh. Pt informed of pathology results and KMT's recommendations for treatment. Pt verbalized understanding and agreeable to treatment with Dr. Felipa Gallo. Pt states he will schedule an appt.   Pt's pathology report faxed to Dr. Baljeet Rogel

## 2021-11-12 ENCOUNTER — PATIENT MESSAGE (OUTPATIENT)
Dept: INTERNAL MEDICINE CLINIC | Facility: CLINIC | Age: 76
End: 2021-11-12

## 2021-11-12 NOTE — TELEPHONE ENCOUNTER
From: Cheryl Thompson  To: Yazmin Hammer DO  Sent: 11/12/2021 3:57 PM CST  Subject: Flu Shot    I received a flu shot at [facility] on 11/2/21 per requirement for volunteering. Please post to my immunization record.

## 2021-11-14 NOTE — PROGRESS NOTES
Susan Biswas is a 77year old male. HPI:     CC:  Patient presents with:  Lesion: LOV 3/8/21 Patient present to f/u on surgery on L cheek .  Patient has hx of BCC        Allergies:  Pollen and Tramadol    HISTORY:    Past Medical History:   Diagnosis mouth nightly. 45 tablet 3   • Fluticasone Propionate 50 MCG/ACT Nasal Suspension 2 sprays by Each Nare route 2 (two) times daily. 2 Bottle 1   • Cholecalciferol (VITAMIN D3) 25 MCG Oral Tab Take 1 tablet by mouth daily.      • Multiple Vitamins-Minerals (P status: Former Smoker        Packs/day: 1.00        Years: 15.00        Pack years: 15        Types: Cigarettes        Quit date: 6/15/1985        Years since quittin.4      Smokeless tobacco: Never Used    Vaping Use      Vaping Use: Never used    Marin careful to wear sunscreen and hat. Nothing else new or different    Patient presents with concerns above. Past notes/ records and appropriate/relevant lab results including pathology and past body maps reviewed.  Updated and new information noted in cur defined  Spec 2 Description >>>>>: right extensor forearm  Spec 2 Comment: r/o BCC pearly erythematous nodule growing recently 8mm base of lesion visible reexcised cauterized ENC. No further treatment should be necessary for this.   Shave/ tangential biops with patient. Therapeutic options reviewed. See  Medications in grid. Instructions reviewed at length.       Lesion at left knee darker brown macule longstanding unchanged    No recurrence of previous atypical nevi    No other susupicious lesions on toda

## 2021-11-14 NOTE — PROGRESS NOTES
Operative Report                     Shave/  Tangential biopsy     Clinical diagnosis:    Size of lesion:    Location:  pt with hx skin cacner new lesions  Spec 1 Description >>>>>: right temple  Spec 1 Comment: r/o BCC pearly plaque 1.5cm ill define

## 2021-11-15 ENCOUNTER — OFFICE VISIT (OUTPATIENT)
Dept: INTERNAL MEDICINE CLINIC | Facility: CLINIC | Age: 76
End: 2021-11-15
Payer: MEDICARE

## 2021-11-15 VITALS
TEMPERATURE: 99 F | HEIGHT: 75 IN | HEART RATE: 85 BPM | DIASTOLIC BLOOD PRESSURE: 78 MMHG | BODY MASS INDEX: 24.25 KG/M2 | WEIGHT: 195 LBS | OXYGEN SATURATION: 95 % | SYSTOLIC BLOOD PRESSURE: 120 MMHG

## 2021-11-15 DIAGNOSIS — E55.9 VITAMIN D DEFICIENCY: ICD-10-CM

## 2021-11-15 DIAGNOSIS — Z00.00 ENCOUNTER FOR ANNUAL HEALTH EXAMINATION: ICD-10-CM

## 2021-11-15 DIAGNOSIS — Z00.00 PHYSICAL EXAM: Primary | ICD-10-CM

## 2021-11-15 DIAGNOSIS — R09.82 POST-NASAL DRIP: ICD-10-CM

## 2021-11-15 DIAGNOSIS — E78.1 PURE HYPERGLYCERIDEMIA: ICD-10-CM

## 2021-11-15 DIAGNOSIS — L57.0 ACTINIC KERATOSIS: ICD-10-CM

## 2021-11-15 DIAGNOSIS — H35.30 MACULAR DEGENERATION OF BOTH EYES, UNSPECIFIED TYPE: ICD-10-CM

## 2021-11-15 DIAGNOSIS — Z12.5 SCREENING PSA (PROSTATE SPECIFIC ANTIGEN): ICD-10-CM

## 2021-11-15 DIAGNOSIS — N39.41 URGE INCONTINENCE: ICD-10-CM

## 2021-11-15 DIAGNOSIS — M41.9 KYPHOSCOLIOSIS: ICD-10-CM

## 2021-11-15 DIAGNOSIS — Z85.038 HISTORY OF COLON CANCER: ICD-10-CM

## 2021-11-15 DIAGNOSIS — E78.1 PURE HYPERTRIGLYCERIDEMIA: ICD-10-CM

## 2021-11-15 DIAGNOSIS — M54.50 ACUTE BILATERAL LOW BACK PAIN WITHOUT SCIATICA: ICD-10-CM

## 2021-11-15 PROCEDURE — 93000 ELECTROCARDIOGRAM COMPLETE: CPT | Performed by: INTERNAL MEDICINE

## 2021-11-15 PROCEDURE — 99214 OFFICE O/P EST MOD 30 MIN: CPT | Performed by: INTERNAL MEDICINE

## 2021-11-15 PROCEDURE — G0439 PPPS, SUBSEQ VISIT: HCPCS | Performed by: INTERNAL MEDICINE

## 2021-11-15 RX ORDER — TAMSULOSIN HYDROCHLORIDE 0.4 MG/1
0.4 CAPSULE ORAL NIGHTLY
Qty: 90 CAPSULE | Refills: 3 | Status: SHIPPED | OUTPATIENT
Start: 2021-11-15

## 2021-11-15 RX ORDER — FLUTICASONE PROPIONATE 50 MCG
2 SPRAY, SUSPENSION (ML) NASAL 2 TIMES DAILY
Qty: 2 EACH | Refills: 5 | Status: SHIPPED | OUTPATIENT
Start: 2021-11-15

## 2021-11-15 RX ORDER — RIBOFLAVIN (VITAMIN B2) 100 MG
100 TABLET ORAL DAILY
COMMUNITY

## 2021-11-15 RX ORDER — SIMVASTATIN 20 MG
10 TABLET ORAL NIGHTLY
Qty: 45 TABLET | Refills: 3 | Status: SHIPPED | OUTPATIENT
Start: 2021-11-15

## 2021-11-15 NOTE — PATIENT INSTRUCTIONS
1. Physical exam  Physical exam instruction: Improve diet and exercise  - ELECTROCARDIOGRAM, COMPLETE: Sinus rhythm at 66 bpm, right bundle branch block, unchanged EKG    2.  Encounter for annual health examination  Stable cont monitoring and management Websites for Advanced Directives    [email or web address]. org/publications/Documents/personal_dec. pdf  An information packet, including necessary form from the [address] website. http://[email or web address] [place]state. [place]/public/books/advin.htm  A link

## 2021-11-16 NOTE — PROGRESS NOTES
HPI:   Curtis Rehman is a 77year old male who presents for a Medicare Subsequent Annual Wellness visit (Pt already had Initial Annual Wellness).     Patient presents with:  Physical: Sometimes in the morning he feels a fogginess or a dizziness, this h Radha Coon DO as PCP - General (Internal Medicine)  Don Hodges MD (GASTROENTEROLOGY)    Patient Active Problem List:     Actinic keratosis     Hyperlipidemia     History of colon cancer     Macular degeneration of both eyes     Urge incontinence (basal cell carcinoma) (11/2021), Colon carcinoma ([facility] [address] (2000), Family history of heart disease, High cholesterol, History of hydrocelectomy (2010), Inguinal hernia, Left eye injury, Other and unspecified hyperlipidemia, Poison ivy, and Skin cancer, basal body mass index is 24.37 kg/m² as calculated from the following:    Height as of this encounter: 6' 3\" (1.905 m). Weight as of this encounter: 195 lb (88.5 kg).     Medicare Hearing Assessment  (Required for AWV/SWV)    Hearing Screening    Time taken: visit:    Physical exam  -     ELECTROCARDIOGRAM, COMPLETE    Encounter for annual health examination    Pure hypertriglyceridemia    Actinic keratosis    History of colon cancer    Macular degeneration of both eyes, unspecified type    Urge incontinence does not think you are making progression, we may want you do the x-rays as well  - PHYSICAL THERAPY - INTERNAL  - XR LUMBAR SPINE (MIN 4 VIEWS) (CPT=72110); Future    11. Kyphoscoliosis  Physical therapy  - PHYSICAL THERAPY - INTERNAL    12.  Pure hypergly level?: Appropriate Exercise;Daily Walks  How would you describe your daily physical activity?: Light  How would you describe your current health state?: Good  How do you maintain positive mental well-being?: Social Interaction;Puzzles     Supplementary Do Cancer Screening    Prostate-Specific Antigen (PSA) Annually Lab Results   Component Value Date    PSA 1.1 06/12/2018     PSA due on 11/10/2023   Immunizations    Influenza Covered once per flu season  Please get every year 11/02/2021  No recommendations a

## 2022-01-27 DIAGNOSIS — N39.41 URGE INCONTINENCE: ICD-10-CM

## 2022-01-28 RX ORDER — TAMSULOSIN HYDROCHLORIDE 0.4 MG/1
CAPSULE ORAL
Qty: 90 CAPSULE | Refills: 3 | OUTPATIENT
Start: 2022-01-28

## 2022-01-28 NOTE — TELEPHONE ENCOUNTER
Current refill request refused due to refill is either a duplicate request or has active refills at the pharmacy. Check previous templates.     Requested Prescriptions     Refused Prescriptions Disp Refills   • tamsulosin ([address]) cap [Pharmacy Med Name: Jimmy Ibanez

## 2022-02-09 RX ORDER — TAMSULOSIN HYDROCHLORIDE 0.4 MG/1
0.4 CAPSULE ORAL NIGHTLY
Qty: 90 CAPSULE | Refills: 3 | Status: CANCELLED | OUTPATIENT
Start: 2022-02-09

## 2022-02-11 ENCOUNTER — PATIENT MESSAGE (OUTPATIENT)
Dept: INTERNAL MEDICINE CLINIC | Facility: CLINIC | Age: 77
End: 2022-02-11

## 2022-02-11 NOTE — TELEPHONE ENCOUNTER
From: Gil Henson  To: Herman Sepulveda DO  Sent: 2/11/2022 8:17 AM CST  Subject: Tamsulosin 0.4 mg    No refills are left. Please authorize Arturo to fill Rx # [phone].  Thanks  Pedro Pablo Hassan

## 2022-02-25 ENCOUNTER — HOSPITAL ENCOUNTER (OUTPATIENT)
Dept: GENERAL RADIOLOGY | Facility: HOSPITAL | Age: 77
Discharge: HOME | End: 2022-02-25
Attending: INTERNAL MEDICINE
Payer: MEDICARE

## 2022-02-25 DIAGNOSIS — M54.50 ACUTE BILATERAL LOW BACK PAIN WITHOUT SCIATICA: ICD-10-CM

## 2022-02-25 PROCEDURE — 72110 X-RAY EXAM L-2 SPINE 4/>VWS: CPT | Performed by: INTERNAL MEDICINE

## 2022-02-28 ENCOUNTER — TELEPHONE (OUTPATIENT)
Dept: INTERNAL MEDICINE CLINIC | Facility: CLINIC | Age: 77
End: 2022-02-28

## 2022-02-28 NOTE — TELEPHONE ENCOUNTER
Per OV 11/15/21:  \"  10. Acute bilateral low back pain without sciatica  Lets think about doing the physical therapy, I did pend some x-rays for you as well, if the therapist does not think you are making progression, we may want you do the x-rays as well  - PHYSICAL THERAPY - INTERNAL  - XR LUMBAR SPINE (MIN 4 VIEWS) (CPT=72110); Future\"    TO Dr. Sarita Quijano to please advise on results. The PT order you placed for Jennifer Thao through [address] [place] [address] PT in November 2021 should still be active.

## 2022-02-28 NOTE — TELEPHONE ENCOUNTER
Please call pt with results of xray taken on Friday for pain between shoulder blades  Pt said xray taken was for lower lumbar  Pt has question about the location of the xray    Pt also requesting order for PT/would he go to [facility]?     Tasked to nursing

## 2022-03-01 NOTE — TELEPHONE ENCOUNTER
Nursing if you could reach out to him, I did pend some orders for thoracic x-rays, and physical therapy, I also sent him a [email or web address] message, hopefully you can follow the string, and complete the phone call. I apologize if this is a repeat message.

## 2022-03-01 NOTE — TELEPHONE ENCOUNTER
Nursing, okay to call patient, let him know I did see the x-ray of the lumbar spine, does show he has scoliosis, arthritic changes, moderate amounts, that of which we knew. I did see this message about the thoracic pain, I like the idea of an x-ray in that area, but some sorry is to go back and repeat this. I like the idea of [facility] for physical therapy to get this started as well if he is still in pain. Fax them my physical therapy order after he specifies where he will be using.

## 2022-03-01 NOTE — TELEPHONE ENCOUNTER
Spoke to patient and he was agreeable to physical therapy and the xr for thoracic said he will do that soon and referral was faxed and confirmed to [facility] in [address].

## 2022-03-02 ENCOUNTER — HOSPITAL ENCOUNTER (OUTPATIENT)
Dept: GENERAL RADIOLOGY | Facility: HOSPITAL | Age: 77
Discharge: HOME | End: 2022-03-02
Attending: INTERNAL MEDICINE
Payer: MEDICARE

## 2022-03-02 DIAGNOSIS — G89.29 CHRONIC BILATERAL THORACIC BACK PAIN: ICD-10-CM

## 2022-03-02 DIAGNOSIS — M54.6 CHRONIC BILATERAL THORACIC BACK PAIN: ICD-10-CM

## 2022-03-02 PROCEDURE — 72070 X-RAY EXAM THORAC SPINE 2VWS: CPT | Performed by: INTERNAL MEDICINE

## 2022-03-02 PROCEDURE — 72072 X-RAY EXAM THORAC SPINE 3VWS: CPT | Performed by: INTERNAL MEDICINE

## 2022-03-09 ENCOUNTER — OFFICE VISIT (OUTPATIENT)
Dept: DERMATOLOGY CLINIC | Facility: CLINIC | Age: 77
End: 2022-03-09
Payer: MEDICARE

## 2022-03-09 DIAGNOSIS — Z85.828 HISTORY OF NONMELANOMA SKIN CANCER: ICD-10-CM

## 2022-03-09 DIAGNOSIS — L82.1 OTHER SEBORRHEIC KERATOSIS: ICD-10-CM

## 2022-03-09 DIAGNOSIS — L57.0 AK (ACTINIC KERATOSIS): Primary | ICD-10-CM

## 2022-03-09 DIAGNOSIS — D23.9 BENIGN NEOPLASM OF SKIN, UNSPECIFIED LOCATION: ICD-10-CM

## 2022-03-09 DIAGNOSIS — D22.9 MULTIPLE NEVI: ICD-10-CM

## 2022-03-09 PROCEDURE — 99213 OFFICE O/P EST LOW 20 MIN: CPT | Performed by: DERMATOLOGY

## 2022-07-11 ENCOUNTER — OFFICE VISIT (OUTPATIENT)
Dept: DERMATOLOGY CLINIC | Facility: CLINIC | Age: 77
End: 2022-07-11
Payer: MEDICARE

## 2022-07-11 DIAGNOSIS — D23.9 BENIGN NEOPLASM OF SKIN, UNSPECIFIED LOCATION: ICD-10-CM

## 2022-07-11 DIAGNOSIS — L82.1 OTHER SEBORRHEIC KERATOSIS: ICD-10-CM

## 2022-07-11 DIAGNOSIS — D22.9 MULTIPLE NEVI: ICD-10-CM

## 2022-07-11 DIAGNOSIS — L57.0 AK (ACTINIC KERATOSIS): Primary | ICD-10-CM

## 2022-07-11 DIAGNOSIS — Z85.828 HISTORY OF NONMELANOMA SKIN CANCER: ICD-10-CM

## 2022-07-11 PROCEDURE — 17003 DESTRUCT PREMALG LES 2-14: CPT | Performed by: DERMATOLOGY

## 2022-07-11 PROCEDURE — 17000 DESTRUCT PREMALG LESION: CPT | Performed by: DERMATOLOGY

## 2022-07-11 PROCEDURE — 99213 OFFICE O/P EST LOW 20 MIN: CPT | Performed by: DERMATOLOGY

## 2022-07-19 DIAGNOSIS — E78.1 PURE HYPERGLYCERIDEMIA: ICD-10-CM

## 2022-07-19 RX ORDER — SIMVASTATIN 20 MG
TABLET ORAL
Qty: 45 TABLET | Refills: 3 | OUTPATIENT
Start: 2022-07-19

## 2022-10-04 ENCOUNTER — PATIENT MESSAGE (OUTPATIENT)
Dept: INTERNAL MEDICINE CLINIC | Facility: CLINIC | Age: 77
End: 2022-10-04

## 2022-10-04 DIAGNOSIS — E78.1 PURE HYPERGLYCERIDEMIA: ICD-10-CM

## 2022-10-05 ENCOUNTER — TELEPHONE (OUTPATIENT)
Dept: INTERNAL MEDICINE CLINIC | Facility: CLINIC | Age: 77
End: 2022-10-05

## 2022-10-05 DIAGNOSIS — E55.9 VITAMIN D DEFICIENCY: Primary | ICD-10-CM

## 2022-10-05 DIAGNOSIS — Z12.5 SCREENING FOR PROSTATE CANCER: ICD-10-CM

## 2022-10-05 DIAGNOSIS — Z00.00 PHYSICAL EXAM: ICD-10-CM

## 2022-10-05 DIAGNOSIS — E78.1 PURE HYPERGLYCERIDEMIA: ICD-10-CM

## 2022-10-05 RX ORDER — SIMVASTATIN 20 MG
10 TABLET ORAL NIGHTLY
Qty: 45 TABLET | Refills: 0 | Status: SHIPPED | OUTPATIENT
Start: 2022-10-05

## 2022-10-05 NOTE — TELEPHONE ENCOUNTER
From: Hermilo Yanez  To: Mary Ruiz, DO  Sent: 10/4/2022 11:39 AM CDT  Subject: Felipe Phaninz    Dr. Janelle Manuel --  I need you to authorize more refills on my Simvastatin prescription #[id number] at [place] on [facility] and [id number] [facility] in [facility]. I have an appt to see you for an annual checkup on Nov 18th. I'm waiting til then to qualify for an annual PSA test. I'll also need an order for a lipid panel and PSA blood draw. My non-fasting cholesterol numbers from [facility] are as follows:  6/17/22 = 153  2/4/22 = 151  11/8/21 = 159  My last colonoscopy was in Nov of 2017 with Dr. Julieth Clayton. I suppose I should get one more? My wife had her last one with Dr. Magaly Trotter. Do I need a referral from you?   Poly Otero

## 2022-10-06 NOTE — TELEPHONE ENCOUNTER
Noted, simvastatin refilled by nursing, lab work entered, I will send him a [employer] message, see message

## 2022-11-15 ENCOUNTER — LAB ENCOUNTER (OUTPATIENT)
Dept: LAB | Facility: HOSPITAL | Age: 77
End: 2022-11-15
Attending: INTERNAL MEDICINE
Payer: MEDICARE

## 2022-11-15 LAB
ALBUMIN SERPL-MCNC: 3.6 G/DL (ref 3.4–5)
ALBUMIN/GLOB SERPL: 1 {RATIO} (ref 1–2)
ALP LIVER SERPL-CCNC: 84 U/L
ALT SERPL-CCNC: 29 U/L
ANION GAP SERPL CALC-SCNC: 6 MMOL/L (ref 0–18)
AST SERPL-CCNC: 17 U/L (ref 15–37)
BASOPHILS # BLD AUTO: 0.06 X10(3) UL (ref 0–0.2)
BASOPHILS NFR BLD AUTO: 0.7 %
BILIRUB SERPL-MCNC: 0.9 MG/DL (ref 0.1–2)
BILIRUB UR QL: NEGATIVE
BUN BLD-MCNC: 19 MG/DL (ref 7–18)
BUN/CREAT SERPL: 17.1 (ref 10–20)
CALCIUM BLD-MCNC: 9.2 MG/DL (ref 8.5–10.1)
CHLORIDE SERPL-SCNC: 110 MMOL/L (ref 98–112)
CHOLEST SERPL-MCNC: 149 MG/DL (ref ?–200)
CLARITY UR: CLEAR
CO2 SERPL-SCNC: 29 MMOL/L (ref 21–32)
COLOR UR: YELLOW
COMPLEXED PSA SERPL-MCNC: 2.3 NG/ML (ref ?–4)
CREAT BLD-MCNC: 1.11 MG/DL
DEPRECATED RDW RBC AUTO: 48.5 FL (ref 35.1–46.3)
EOSINOPHIL # BLD AUTO: 0.28 X10(3) UL (ref 0–0.7)
EOSINOPHIL NFR BLD AUTO: 3.3 %
ERYTHROCYTE [DISTWIDTH] IN BLOOD BY AUTOMATED COUNT: 13.1 % (ref 11–15)
FASTING PATIENT LIPID ANSWER: YES
FASTING STATUS PATIENT QL REPORTED: YES
GFR SERPLBLD BASED ON 1.73 SQ M-ARVRAT: 69 ML/MIN/1.73M2 (ref 60–?)
GLOBULIN PLAS-MCNC: 3.5 G/DL (ref 2.8–4.4)
GLUCOSE BLD-MCNC: 101 MG/DL (ref 70–99)
GLUCOSE UR-MCNC: NEGATIVE MG/DL
HCT VFR BLD AUTO: 47.7 %
HDLC SERPL-MCNC: 58 MG/DL (ref 40–59)
HGB BLD-MCNC: 15.3 G/DL
HGB UR QL STRIP.AUTO: NEGATIVE
IMM GRANULOCYTES # BLD AUTO: 0.03 X10(3) UL (ref 0–1)
IMM GRANULOCYTES NFR BLD: 0.4 %
KETONES UR-MCNC: NEGATIVE MG/DL
LDLC SERPL CALC-MCNC: 74 MG/DL (ref ?–100)
LEUKOCYTE ESTERASE UR QL STRIP.AUTO: NEGATIVE
LYMPHOCYTES # BLD AUTO: 1.55 X10(3) UL (ref 1–4)
LYMPHOCYTES NFR BLD AUTO: 18.5 %
MCH RBC QN AUTO: 32.1 PG (ref 26–34)
MCHC RBC AUTO-ENTMCNC: 32.1 G/DL (ref 31–37)
MCV RBC AUTO: 100.2 FL
MONOCYTES # BLD AUTO: 0.69 X10(3) UL (ref 0.1–1)
MONOCYTES NFR BLD AUTO: 8.3 %
NEUTROPHILS # BLD AUTO: 5.75 X10 (3) UL (ref 1.5–7.7)
NEUTROPHILS # BLD AUTO: 5.75 X10(3) UL (ref 1.5–7.7)
NEUTROPHILS NFR BLD AUTO: 68.8 %
NITRITE UR QL STRIP.AUTO: NEGATIVE
NONHDLC SERPL-MCNC: 91 MG/DL (ref ?–130)
OSMOLALITY SERPL CALC.SUM OF ELEC: 302 MOSM/KG (ref 275–295)
PH UR: 7 [PH] (ref 5–8)
PLATELET # BLD AUTO: 301 10(3)UL (ref 150–450)
POTASSIUM SERPL-SCNC: 4.6 MMOL/L (ref 3.5–5.1)
PROT SERPL-MCNC: 7.1 G/DL (ref 6.4–8.2)
PROT UR-MCNC: NEGATIVE MG/DL
RBC # BLD AUTO: 4.76 X10(6)UL
SODIUM SERPL-SCNC: 145 MMOL/L (ref 136–145)
SP GR UR STRIP: 1.01 (ref 1–1.03)
TRIGL SERPL-MCNC: 94 MG/DL (ref 30–149)
TSI SER-ACNC: 2.71 MIU/ML (ref 0.36–3.74)
UROBILINOGEN UR STRIP-ACNC: <2
VIT C UR-MCNC: NEGATIVE MG/DL
VIT D+METAB SERPL-MCNC: 59 NG/ML (ref 30–100)
VLDLC SERPL CALC-MCNC: 14 MG/DL (ref 0–30)
WBC # BLD AUTO: 8.4 X10(3) UL (ref 4–11)

## 2022-11-15 PROCEDURE — 85025 COMPLETE CBC W/AUTO DIFF WBC: CPT | Performed by: INTERNAL MEDICINE

## 2022-11-15 PROCEDURE — 80053 COMPREHEN METABOLIC PANEL: CPT | Performed by: INTERNAL MEDICINE

## 2022-11-15 PROCEDURE — 81003 URINALYSIS AUTO W/O SCOPE: CPT | Performed by: INTERNAL MEDICINE

## 2022-11-15 PROCEDURE — 84443 ASSAY THYROID STIM HORMONE: CPT | Performed by: INTERNAL MEDICINE

## 2022-11-15 PROCEDURE — 82306 VITAMIN D 25 HYDROXY: CPT | Performed by: INTERNAL MEDICINE

## 2022-11-15 PROCEDURE — 36415 COLL VENOUS BLD VENIPUNCTURE: CPT | Performed by: INTERNAL MEDICINE

## 2022-11-15 PROCEDURE — 80061 LIPID PANEL: CPT | Performed by: INTERNAL MEDICINE

## 2022-11-18 ENCOUNTER — TELEPHONE (OUTPATIENT)
Dept: INTERNAL MEDICINE CLINIC | Facility: CLINIC | Age: 77
End: 2022-11-18

## 2022-11-18 ENCOUNTER — OFFICE VISIT (OUTPATIENT)
Dept: INTERNAL MEDICINE CLINIC | Facility: CLINIC | Age: 77
End: 2022-11-18
Payer: MEDICARE

## 2022-11-18 ENCOUNTER — MED REC SCAN ONLY (OUTPATIENT)
Dept: INTERNAL MEDICINE CLINIC | Facility: CLINIC | Age: 77
End: 2022-11-18

## 2022-11-18 VITALS
DIASTOLIC BLOOD PRESSURE: 82 MMHG | OXYGEN SATURATION: 98 % | HEART RATE: 82 BPM | WEIGHT: 197 LBS | TEMPERATURE: 98 F | BODY MASS INDEX: 24.49 KG/M2 | SYSTOLIC BLOOD PRESSURE: 132 MMHG | HEIGHT: 75 IN

## 2022-11-18 DIAGNOSIS — H35.30 MACULAR DEGENERATION OF BOTH EYES, UNSPECIFIED TYPE: ICD-10-CM

## 2022-11-18 DIAGNOSIS — L57.0 ACTINIC KERATOSIS: ICD-10-CM

## 2022-11-18 DIAGNOSIS — Z00.00 ENCOUNTER FOR ANNUAL HEALTH EXAMINATION: Primary | ICD-10-CM

## 2022-11-18 DIAGNOSIS — N39.41 URGE INCONTINENCE: ICD-10-CM

## 2022-11-18 DIAGNOSIS — J06.9 UPPER RESPIRATORY TRACT INFECTION, UNSPECIFIED TYPE: ICD-10-CM

## 2022-11-18 DIAGNOSIS — E78.1 PURE HYPERTRIGLYCERIDEMIA: ICD-10-CM

## 2022-11-18 DIAGNOSIS — Z85.038 HISTORY OF COLON CANCER: ICD-10-CM

## 2022-11-18 RX ORDER — TAMSULOSIN HYDROCHLORIDE 0.4 MG/1
0.4 CAPSULE ORAL NIGHTLY
Qty: 90 CAPSULE | Refills: 3 | Status: SHIPPED | OUTPATIENT
Start: 2022-11-18

## 2022-11-18 RX ORDER — SIMVASTATIN 20 MG
10 TABLET ORAL NIGHTLY
Qty: 45 TABLET | Refills: 3 | Status: SHIPPED | OUTPATIENT
Start: 2022-11-18

## 2022-11-18 RX ORDER — AZITHROMYCIN 250 MG/1
TABLET, FILM COATED ORAL
Qty: 6 TABLET | Refills: 0 | Status: SHIPPED | OUTPATIENT
Start: 2022-11-18 | End: 2022-11-23

## 2022-11-18 NOTE — TELEPHONE ENCOUNTER
Respiratory infection triage:    Fever:  [x]  No fever  []  Fever>100.4    Cough:  [] Tight cough  [] Cough with exertion  [x] Dry cough  [] Sputum production, Color:     Breathing:  [] Mild shortness of breath interfering with activity  [] Wheezing  [] Pain with deep breathing  [] Using inhaler    Other symptoms:  [] Sore throat  [] Difficulty swallowing  [x] Nasal drainage/congestion  [x] Sinus congestion/pressure, PND at night  [] Ear pain  [] Body aches  [] Poor appetite  [] Loss of sense of smell   [] Loss of sense of taste  []Conjunctivitis? [] Any recent travel? [] Any sick contacts? [] Are you a healthcare worker? ADDITIONAL NOTES:  Spoke with pt, reports symptom onset 3 days ago. Home covid test negative yesterday 11/17. Denies any sick contacts. Taking OTC robitussin. Pt is scheduled for annual physical today with Dr. Justin Anderson at 2:30. Dr. Justin Anderson, please advise if ok to be seen in office today. Advised that we will call pt back with Dr. Newton Cassette response.

## 2022-11-18 NOTE — TELEPHONE ENCOUNTER
Pt has an appt today at 2:30pm with Dr Kaylan Landeros for his annual check up  Pt has a cold, tested negative for covid yesterday, can he still be seen today     Please call to advise [phone]

## 2022-11-18 NOTE — PATIENT INSTRUCTIONS
1. Encounter for annual health examination  Physical exam instruction: Improve diet and exercise    2. Urge incontinence  Lets stay with the same medications here, and go from there  - tamsulosin 0.4 MG Oral Cap; Take 1 capsule (0.4 mg total) by mouth nightly. Dispense: 90 capsule; Refill: 3    3. Macular degeneration of both eyes, unspecified type  Stable continue current monitoring management, follow-up with ophthalmology    4. History of colon cancer  I do like the idea of the Cologuard test, but this does not replace the colonoscopy, we need to be in with Dr. Nemesio Thomason or his next available associate to get established, lets make that call right away  - GASTRO - EXTERNAL    5. Actinic keratosis  Stable continue current monitor management follow-up with dermatology    6. Pure hypertriglyceridemia  Stable continue current monitor management, simvastatin refilled    7. Upper respiratory tract infection, unspecified type  Lets continue conservative management, only using the next plan of care if needed.

## 2022-11-18 NOTE — TELEPHONE ENCOUNTER
Discussed with Dr. Vikki Wakefield. Per Dr. Vikki Wakefield, ok to be seen in office today. Called pt and notified. He verbalized understanding.

## 2022-11-18 NOTE — PROGRESS NOTES
Cologuard order form filled out and signed. Faxed with face sheet to [phone]. Confirmation received. Sent to scanning.

## 2022-11-22 ENCOUNTER — TELEPHONE (OUTPATIENT)
Dept: INTERNAL MEDICINE CLINIC | Facility: CLINIC | Age: 77
End: 2022-11-22

## 2022-11-22 NOTE — TELEPHONE ENCOUNTER
Pt requests call back to advise instructions for Cologuard test Dr Dakota Woody ordered   [phone]

## 2022-11-22 NOTE — TELEPHONE ENCOUNTER
Patient received correspondence from [email or web address] via text, email and phone call. They were able to answer his questions. Nothing further needed.

## 2022-12-06 LAB — AMB EXT COLOGUARD RESULT: NEGATIVE

## 2022-12-17 LAB — AMB EXT COVID-19 RESULT: DETECTED

## 2022-12-18 ENCOUNTER — PATIENT MESSAGE (OUTPATIENT)
Dept: INTERNAL MEDICINE CLINIC | Facility: CLINIC | Age: 77
End: 2022-12-18

## 2022-12-19 ENCOUNTER — TELEPHONE (OUTPATIENT)
Dept: INTERNAL MEDICINE CLINIC | Facility: CLINIC | Age: 77
End: 2022-12-19

## 2022-12-19 DIAGNOSIS — U07.1 COVID-19: Primary | ICD-10-CM

## 2022-12-19 NOTE — TELEPHONE ENCOUNTER
From: Kavon Schaeffer  To: Zonia Scanlon DO  Sent: 12/18/2022 1:11 PM CST  Subject: Kenya Anderson --  I tested positive for COVID on Saturday morning, December 17th. I'm having mild symptoms of runny nose, raspy throat, headache when I get up in the morning, and nasal congestion throughout the day and night. I have been taking tylenol during the day and Robitussin to help me sleep. Can you suggest anything else to insure I won't have to cancel my Raúl gatherings?   Ced Andujar

## 2022-12-19 NOTE — TELEPHONE ENCOUNTER
----- Message from Cipriano Perkins. Graciela Means sent at 12/18/2022  1:11 PM CST -----  Regarding: Ernesto Bahena --  I tested positive for COVID on Saturday morning, December 17th. I'm having mild symptoms of runny nose, raspy throat, headache when I get up in the morning, and nasal congestion throughout the day and night. I have been taking tylenol during the day and Robitussin to help me sleep. Can you suggest anything else to insure I won't have to cancel my Raú[place] gatherings?   Mejia Adame

## 2022-12-19 NOTE — TELEPHONE ENCOUNTER
Nursing tell him I would like him to start taking the new antiviral paxlovid, he needs to stop the simvastatin for the days he is on this, and set him up on my schedule tomorrow morning for the virtual respiratory clinic. We will connect by phone and I will give him more information as I speak with him tomorrow morning.

## 2022-12-19 NOTE — TELEPHONE ENCOUNTER
Called and spoke with patient. Relayed MD's message. Set patient up with virtual visit tomorrow morning.

## 2022-12-20 ENCOUNTER — VIRTUAL PHONE E/M (OUTPATIENT)
Dept: INTERNAL MEDICINE CLINIC | Facility: CLINIC | Age: 77
End: 2022-12-20
Payer: MEDICARE

## 2022-12-20 DIAGNOSIS — U07.1 COVID-19: Primary | ICD-10-CM

## 2022-12-20 PROCEDURE — 99443 PHONE E/M BY PHYS 21-30 MIN: CPT | Performed by: INTERNAL MEDICINE

## 2022-12-20 NOTE — PROGRESS NOTES
Virtual Visit/Telephone Note    Irma Alex verbally consents to a Virtual/Telephone Check-In service on 22  Patient understands and accepts financial responsibility for any deductible, co-insurance and/or co-pays associated with this service. Duration/time spent of the service: 20 Minutes of direct patient contact. 10 Minutes of chart review, documentation, medical decision making. HPI:   Irma Alex is a 69year old male who presents for complains of: Patient presents with:  Covid: Patient COVID-positive since 2022, mild upper respiratory congestion, mild fevers, started taking paxlovid after he called last night had triage by nurse was set up for a phone visit with me this morning. He does not know of any sick contacts, is vaccinated and up-to-date. He claims he already feels better, fevers have already resolved, has minimal nasal congestion, but is concerned about family gatherings later this week. Discussed at length he verbalized understanding      Physical exam:   Telephone visit only, no obvious pain no obvious shortness of breath, she has been his usual state of health    ASSESSMENT AND PLAN:   Irma Alex is a 69year old male who presents with the followin. COVID-19  sorry you have come down with Covid-19, but lets try to follow the plan below:  I do like you scheduling the Tylenol 2 tabs at least 3 times a day for the next 3-5 days, taking this with food always helps. It is certainly okay for over-the-counter cough syrups and decongestant medicine as you normally would to control symptoms of a upper respiratory infection. These would be things like Mucinex DM, or Delsym/Robitussin. Do not forget to eat, this process will burn more calories than usual, make sure you replace that, and stay hydrated from a fluid perspective as well.   Make sure you stay active, blood clots are real as a complication for this disease, so make sure you stay as active as you can even though you are supposed to be home on quarantine. If you see any signs or symptoms of blood clots (which are: worsening shortness of breath, tender or swollen calf on one side without provocation, etc), we recommend you present immediately to the emergency room for imaging. Lets keep to the precautions with being on quarantine for at least 5 days from the onset of your symptoms, previously this was 7 days, but the CDC has recently lowered this, and now we can return to full activity on the 6-day after the onset of our illness. I like the idea of the vaccine series as well as well as available booster vaccines, for you the earliest would be 2 weeks after your illness is better, expect to feel a bit ill after your booster vaccine, for about 24 hours, this is expected. The optimal time to get the next available vaccine in the series would be 3 months after your current illness is resolved. If you are getting worse or your oxygen saturations drop below 90%, or your symptoms are worsening, I want you in the emergency room to get seen and possible IV treatment  Complete any antibiotics that may have started already, and keep boosting the immune system with optional vitamin C 500 mg daily, zinc sulfate 50 mg twice daily, vitamin D if you can, call me if you are getting worse  For now newer antiviral medications are being used, continue and complete these if I have recommended them for you, realize that medication paxlovid (nirmatrelvir&ritonavir) does have the distinct side effect of a metallic taste in the mouth. Use frequent sips of water, or lemon candies to quell the side effect. The IV monoclonal treatment can be used in special cases, but only if someone has presented to the emergency room, has significant comorbidities, and disease.       Ripley Marry D'Amico,   12/20/2022  11:18 AM    Spent 30 minutes obtaining history, evaluating patient, discussing treatment options, diet, exercise, review of available labs and radiology reports, and completing documentation.

## 2022-12-22 ENCOUNTER — TELEPHONE (OUTPATIENT)
Dept: INTERNAL MEDICINE CLINIC | Facility: CLINIC | Age: 77
End: 2022-12-22

## 2022-12-22 NOTE — TELEPHONE ENCOUNTER
Excellent, nursing you can call the patient inform him this Cologuard testing was negative, follow-up with me as last discussed follow the plan as we last discussed.

## 2022-12-22 NOTE — TELEPHONE ENCOUNTER
TO Dr. Alyssa Law-- received NEGATIVE cologuard result from specimen collected 12/6/22. Care gaps updated.

## 2023-01-11 ENCOUNTER — OFFICE VISIT (OUTPATIENT)
Dept: DERMATOLOGY CLINIC | Facility: CLINIC | Age: 78
End: 2023-01-11

## 2023-01-11 DIAGNOSIS — L81.4 LENTIGO: ICD-10-CM

## 2023-01-11 DIAGNOSIS — L82.1 OTHER SEBORRHEIC KERATOSIS: ICD-10-CM

## 2023-01-11 DIAGNOSIS — Z85.828 HISTORY OF NONMELANOMA SKIN CANCER: ICD-10-CM

## 2023-01-11 DIAGNOSIS — L57.0 AK (ACTINIC KERATOSIS): ICD-10-CM

## 2023-01-11 DIAGNOSIS — D48.5 NEOPLASM OF UNCERTAIN BEHAVIOR OF SKIN: Primary | ICD-10-CM

## 2023-01-11 DIAGNOSIS — D22.9 MULTIPLE NEVI: ICD-10-CM

## 2023-01-11 DIAGNOSIS — D23.9 BENIGN NEOPLASM OF SKIN, UNSPECIFIED LOCATION: ICD-10-CM

## 2023-01-11 PROCEDURE — 99213 OFFICE O/P EST LOW 20 MIN: CPT | Performed by: DERMATOLOGY

## 2023-01-11 PROCEDURE — 11102 TANGNTL BX SKIN SINGLE LES: CPT | Performed by: DERMATOLOGY

## 2023-01-11 PROCEDURE — 17000 DESTRUCT PREMALG LESION: CPT | Performed by: DERMATOLOGY

## 2023-01-11 PROCEDURE — 17003 DESTRUCT PREMALG LES 2-14: CPT | Performed by: DERMATOLOGY

## 2023-01-11 PROCEDURE — 88305 TISSUE EXAM BY PATHOLOGIST: CPT | Performed by: DERMATOLOGY

## 2023-01-14 NOTE — PROGRESS NOTES
The pathology report from last visit showed   left medial cheek, shave biopsy:  -Actinic keratosis and chronic folliculitis  No invasive squamous cell carcinoma. No surgery. Please log in test results. Please call patient and inform of results and recommendations.  (please add to history).   Pt to  rtc   6 months as planned or as needed

## 2023-01-15 NOTE — PROGRESS NOTES
Operative Report                     Shave/  Tangential biopsy     Clinical diagnosis:    Size of lesion:  pt with history of skin cancer  Spec 1 Description >>>>>: left medial cheek  Spec 1 Comment: new keratotic papule 4mm r/o SCC vs other  Location:    Procedure: With patient in appropriate position the skin of the above was scrubbed with alcohol. Anesthesia was obtained with 1% Xylocaine with epinephrine. The skin surrounding the lesion was placed under tension and the lesion was incised using a #15 scalpel blade. The specimen was sent for histopathologic exam.    Hemostasis was obtained with electrocautery/aluminum chloride. Estimated blood loss less than 2 cc. Biopsy dressed with Polysporin, bandage. Pressure dressing:   No    Complications: None    Written instructions given and reviewed with patient    Await pathology    Contact information reviewed.     Procedural physician:  Mnoica Srivastava MD

## 2023-03-03 ENCOUNTER — TELEPHONE (OUTPATIENT)
Dept: INTERNAL MEDICINE CLINIC | Facility: CLINIC | Age: 78
End: 2023-03-03

## 2023-03-03 NOTE — TELEPHONE ENCOUNTER
Patient states he is trying to get hearing aids but was instructed to call our office and schedule an ear cleaning/wax removal. Dr Russ Rodriguez has no openings until April. Patient does not want to wait that long. Can patient be added sooner or okay to wait?      # [phone]

## 2023-03-06 ENCOUNTER — OFFICE VISIT (OUTPATIENT)
Dept: OTOLARYNGOLOGY | Facility: CLINIC | Age: 78
End: 2023-03-06

## 2023-03-06 DIAGNOSIS — H61.23 CERUMEN DEBRIS ON TYMPANIC MEMBRANE OF BOTH EARS: Primary | ICD-10-CM

## 2023-03-06 PROCEDURE — 69210 REMOVE IMPACTED EAR WAX UNI: CPT | Performed by: SPECIALIST

## 2023-03-06 NOTE — PROGRESS NOTES
Ears = bilateral cerumen occlussions. Fully cleaned under microscope using instrumentation and suctioning. Normal tympanic membranes. Follow-up in 6 to 12 months time, sooner if problems.

## 2023-04-02 ENCOUNTER — PATIENT MESSAGE (OUTPATIENT)
Dept: INTERNAL MEDICINE CLINIC | Facility: CLINIC | Age: 78
End: 2023-04-02

## 2023-04-03 ENCOUNTER — TELEPHONE (OUTPATIENT)
Dept: INTERNAL MEDICINE CLINIC | Facility: CLINIC | Age: 78
End: 2023-04-03

## 2023-04-03 DIAGNOSIS — M25.511 RIGHT SHOULDER PAIN, UNSPECIFIED CHRONICITY: Primary | ICD-10-CM

## 2023-04-03 NOTE — TELEPHONE ENCOUNTER
Referral placed per written below. [facility] message sent to pt with Dr. Joshua Gray contact info.

## 2023-04-03 NOTE — TELEPHONE ENCOUNTER
----- Message from Veronika Hernandez. Briana Tolbert sent at 4/2/2023 12:38 PM CDT -----  Regarding: Referral  Contact: [phone]  I need a referral for an orthopedic doc to look at my right shoulder. I do have full range of motion, but I can not push a broom or exert any lateral force without pain. I can pull the broom with no problem. I'm having to use my left arm disproportionately to my right arm. I feel some slippage or clicking in my shoulder. Would you recommend Dr. Alek Raymond?   Alek Cantu

## 2023-04-03 NOTE — TELEPHONE ENCOUNTER
Nursing, okay to contact this patient or respond via [address], Dr. Ayana Rosado would be fine as far as an orthopedic referral, try to put a diagnosis of shoulder pain if needed for diagnosis.   Create referral.

## 2023-04-03 NOTE — TELEPHONE ENCOUNTER
From: Kareen Gallego  To: Kae Banegas DO  Sent: 4/2/2023 12:38 PM CDT  Subject: Referral    I need a referral for an orthopedic doc to look at my right shoulder. I do have full range of motion, but I can not push a broom or exert any lateral force without pain. I can pull the broom with no problem. I'm having to use my left arm disproportionately to my right arm. I feel some slippage or clicking in my shoulder. Would you recommend Dr. Cecy Rae?   Yann Grace

## 2023-05-01 ENCOUNTER — HOSPITAL ENCOUNTER (OUTPATIENT)
Dept: GENERAL RADIOLOGY | Facility: HOSPITAL | Age: 78
Discharge: HOME | End: 2023-05-01
Attending: ORTHOPAEDIC SURGERY
Payer: MEDICARE

## 2023-05-01 ENCOUNTER — OFFICE VISIT (OUTPATIENT)
Dept: ORTHOPEDICS CLINIC | Facility: CLINIC | Age: 78
End: 2023-05-01

## 2023-05-01 VITALS — DIASTOLIC BLOOD PRESSURE: 79 MMHG | SYSTOLIC BLOOD PRESSURE: 123 MMHG | HEART RATE: 73 BPM

## 2023-05-01 DIAGNOSIS — M25.511 RIGHT SHOULDER PAIN, UNSPECIFIED CHRONICITY: ICD-10-CM

## 2023-05-01 DIAGNOSIS — M12.811 RIGHT ROTATOR CUFF TEAR ARTHROPATHY: Primary | ICD-10-CM

## 2023-05-01 DIAGNOSIS — M75.101 RIGHT ROTATOR CUFF TEAR ARTHROPATHY: Primary | ICD-10-CM

## 2023-05-01 DIAGNOSIS — M19.011 PRIMARY OSTEOARTHRITIS OF RIGHT SHOULDER: ICD-10-CM

## 2023-05-01 PROCEDURE — 73030 X-RAY EXAM OF SHOULDER: CPT | Performed by: ORTHOPAEDIC SURGERY

## 2023-05-01 RX ORDER — TRIAMCINOLONE ACETONIDE 40 MG/ML
40 INJECTION, SUSPENSION INTRA-ARTICULAR; INTRAMUSCULAR ONCE
Status: COMPLETED | OUTPATIENT
Start: 2023-05-01 | End: 2023-05-01

## 2023-05-01 RX ADMIN — TRIAMCINOLONE ACETONIDE 40 MG: 40 INJECTION, SUSPENSION INTRA-ARTICULAR; INTRAMUSCULAR at 16:39:00

## 2023-05-01 NOTE — PROGRESS NOTES
Per verbal order from DOMONIQUE Gray, draw up 5ml of 0.5% Marcaine and 1ml of Kenalog 40 for cortisone injection to right shoulder. David Dawn MA  Patient provided education handout for cortisone injection.

## 2023-05-25 ENCOUNTER — TELEPHONE (OUTPATIENT)
Dept: PHYSICAL THERAPY | Facility: HOSPITAL | Age: 78
End: 2023-05-25

## 2023-05-26 ENCOUNTER — TELEPHONE (OUTPATIENT)
Dept: PHYSICAL THERAPY | Facility: HOSPITAL | Age: 78
End: 2023-05-26

## 2023-05-30 ENCOUNTER — TELEPHONE (OUTPATIENT)
Dept: PHYSICAL THERAPY | Facility: HOSPITAL | Age: 78
End: 2023-05-30

## 2023-06-06 ENCOUNTER — OFFICE VISIT (OUTPATIENT)
Dept: PHYSICAL THERAPY | Facility: HOSPITAL | Age: 78
End: 2023-06-06
Attending: ORTHOPAEDIC SURGERY
Payer: MEDICARE

## 2023-06-06 DIAGNOSIS — M25.511 RIGHT SHOULDER PAIN, UNSPECIFIED CHRONICITY: ICD-10-CM

## 2023-06-06 DIAGNOSIS — M75.101 RIGHT ROTATOR CUFF TEAR ARTHROPATHY: ICD-10-CM

## 2023-06-06 DIAGNOSIS — M12.811 RIGHT ROTATOR CUFF TEAR ARTHROPATHY: ICD-10-CM

## 2023-06-06 DIAGNOSIS — M19.011 PRIMARY OSTEOARTHRITIS OF RIGHT SHOULDER: ICD-10-CM

## 2023-06-06 PROCEDURE — 97161 PT EVAL LOW COMPLEX 20 MIN: CPT

## 2023-06-06 PROCEDURE — 97110 THERAPEUTIC EXERCISES: CPT

## 2023-06-07 ENCOUNTER — OFFICE VISIT (OUTPATIENT)
Dept: OTOLARYNGOLOGY | Facility: CLINIC | Age: 78
End: 2023-06-07

## 2023-06-07 VITALS — TEMPERATURE: 98 F | WEIGHT: 197 LBS | HEIGHT: 75 IN | BODY MASS INDEX: 24.49 KG/M2

## 2023-06-07 DIAGNOSIS — H61.23 BILATERAL IMPACTED CERUMEN: Primary | ICD-10-CM

## 2023-06-07 PROCEDURE — 69210 REMOVE IMPACTED EAR WAX UNI: CPT | Performed by: STUDENT IN AN ORGANIZED HEALTH CARE EDUCATION/TRAINING PROGRAM

## 2023-06-08 ENCOUNTER — OFFICE VISIT (OUTPATIENT)
Dept: PHYSICAL THERAPY | Facility: HOSPITAL | Age: 78
End: 2023-06-08
Attending: ORTHOPAEDIC SURGERY
Payer: MEDICARE

## 2023-06-08 PROCEDURE — 97140 MANUAL THERAPY 1/> REGIONS: CPT

## 2023-06-08 PROCEDURE — 97110 THERAPEUTIC EXERCISES: CPT

## 2023-06-13 ENCOUNTER — OFFICE VISIT (OUTPATIENT)
Dept: PHYSICAL THERAPY | Facility: HOSPITAL | Age: 78
End: 2023-06-13
Attending: ORTHOPAEDIC SURGERY
Payer: MEDICARE

## 2023-06-13 PROCEDURE — 97110 THERAPEUTIC EXERCISES: CPT

## 2023-06-13 PROCEDURE — 97140 MANUAL THERAPY 1/> REGIONS: CPT

## 2023-06-14 ENCOUNTER — OFFICE VISIT (OUTPATIENT)
Dept: DERMATOLOGY CLINIC | Facility: CLINIC | Age: 78
End: 2023-06-14

## 2023-06-14 DIAGNOSIS — L57.0 AK (ACTINIC KERATOSIS): Primary | ICD-10-CM

## 2023-06-14 DIAGNOSIS — D23.9 BENIGN NEOPLASM OF SKIN, UNSPECIFIED LOCATION: ICD-10-CM

## 2023-06-14 DIAGNOSIS — D22.9 MULTIPLE NEVI: ICD-10-CM

## 2023-06-14 DIAGNOSIS — Z85.828 HISTORY OF NONMELANOMA SKIN CANCER: ICD-10-CM

## 2023-06-14 DIAGNOSIS — L81.4 LENTIGO: ICD-10-CM

## 2023-06-14 DIAGNOSIS — L82.1 OTHER SEBORRHEIC KERATOSIS: ICD-10-CM

## 2023-06-14 PROCEDURE — 17003 DESTRUCT PREMALG LES 2-14: CPT | Performed by: DERMATOLOGY

## 2023-06-14 PROCEDURE — 17000 DESTRUCT PREMALG LESION: CPT | Performed by: DERMATOLOGY

## 2023-06-14 PROCEDURE — 99213 OFFICE O/P EST LOW 20 MIN: CPT | Performed by: DERMATOLOGY

## 2023-06-14 RX ORDER — FLUOROURACIL 50 MG/G
CREAM TOPICAL
Qty: 40 G | Refills: 1 | Status: SHIPPED | OUTPATIENT
Start: 2023-06-14

## 2023-06-15 ENCOUNTER — OFFICE VISIT (OUTPATIENT)
Dept: PHYSICAL THERAPY | Facility: HOSPITAL | Age: 78
End: 2023-06-15
Attending: ORTHOPAEDIC SURGERY
Payer: MEDICARE

## 2023-06-15 PROCEDURE — 97110 THERAPEUTIC EXERCISES: CPT

## 2023-06-15 PROCEDURE — 97140 MANUAL THERAPY 1/> REGIONS: CPT

## 2023-06-20 ENCOUNTER — OFFICE VISIT (OUTPATIENT)
Dept: PHYSICAL THERAPY | Facility: HOSPITAL | Age: 78
End: 2023-06-20
Attending: ORTHOPAEDIC SURGERY
Payer: MEDICARE

## 2023-06-20 PROCEDURE — 97140 MANUAL THERAPY 1/> REGIONS: CPT

## 2023-06-20 PROCEDURE — 97110 THERAPEUTIC EXERCISES: CPT

## 2023-06-22 ENCOUNTER — OFFICE VISIT (OUTPATIENT)
Dept: PHYSICAL THERAPY | Facility: HOSPITAL | Age: 78
End: 2023-06-22
Attending: ORTHOPAEDIC SURGERY
Payer: MEDICARE

## 2023-06-22 PROCEDURE — 97110 THERAPEUTIC EXERCISES: CPT

## 2023-06-22 PROCEDURE — 97140 MANUAL THERAPY 1/> REGIONS: CPT

## 2023-06-28 ENCOUNTER — OFFICE VISIT (OUTPATIENT)
Dept: PHYSICAL THERAPY | Facility: HOSPITAL | Age: 78
End: 2023-06-28
Attending: ORTHOPAEDIC SURGERY
Payer: MEDICARE

## 2023-06-28 PROCEDURE — 97110 THERAPEUTIC EXERCISES: CPT

## 2023-06-28 PROCEDURE — 97140 MANUAL THERAPY 1/> REGIONS: CPT

## 2023-06-29 ENCOUNTER — TELEPHONE (OUTPATIENT)
Dept: PHYSICAL THERAPY | Facility: HOSPITAL | Age: 78
End: 2023-06-29

## 2023-06-30 ENCOUNTER — OFFICE VISIT (OUTPATIENT)
Dept: PHYSICAL THERAPY | Facility: HOSPITAL | Age: 78
End: 2023-06-30
Attending: ORTHOPAEDIC SURGERY
Payer: MEDICARE

## 2023-06-30 PROCEDURE — 97110 THERAPEUTIC EXERCISES: CPT

## 2023-06-30 PROCEDURE — 97140 MANUAL THERAPY 1/> REGIONS: CPT

## 2023-07-03 ENCOUNTER — TELEPHONE (OUTPATIENT)
Dept: PHYSICAL THERAPY | Facility: HOSPITAL | Age: 78
End: 2023-07-03

## 2023-07-05 ENCOUNTER — OFFICE VISIT (OUTPATIENT)
Dept: PHYSICAL THERAPY | Facility: HOSPITAL | Age: 78
End: 2023-07-05
Attending: ORTHOPAEDIC SURGERY
Payer: MEDICARE

## 2023-07-05 PROCEDURE — 97140 MANUAL THERAPY 1/> REGIONS: CPT

## 2023-07-05 PROCEDURE — 97110 THERAPEUTIC EXERCISES: CPT

## 2023-07-05 NOTE — PROGRESS NOTES
Diagnosis:   Right shoulder pain, unspecified chronicity (M25.511)  Right rotator cuff tear arthropathy (M75.101,M12.811)  Primary osteoarthritis of right shoulder (M19.011)       Referring Provider: [place] Mom  Date of Evaluation:    6/6/2023    Precautions:  Cancer Next MD visit:   none scheduled  Date of Surgery: n/a   Insurance Primary/Secondary: MEDICARE / [employer]     # Auth Visits: 10            Subjective: The pt reports the shoulder does not seem to be getting much better, is resting it on days off from therapy as it feels good not to use it. Pain: 0/10 at rest, 2/10 with movement      Objective:   Observation/Posture: RTC atrophy, elevated clavicle  Palpation: No TTP R shoulder    AROM: (* denotes performed with pain)  Shoulder  Elbow PROM   Flexion: R 138; L 137  Abduction: R 147; L 143  ER: R T3; L T3  IR: R T6; L T6 Flexion: R WFL; L WFL  Extension: R WFL; L WFL Flexion: R 158*  Abduction: R 151  ER: R 60  IR: R 66      Accessory motion: GH mobility B hypomobile worse on the R     Strength/MMT: (* denotes performed with pain)  Shoulder Elbow Scapular   Flexion: R 3+/5*; L 5/5  Abduction: R 3+/5; L 5/5  ER: R 3+/5; L 4+/5  IR: R 4/5; L 5/5 Flexion: R 5/5; L 5/5  Extension: R 5/5; L 5/5 Mid trap: R 3+/5; L 4/5   Low trap: R 4/5; L 4/5  Serratus: R 3+/5*; L 5/5      Special tests:   Labrum Special Testing:   Compression Rotation Test: R +     Subacromial Pain Syndrome:  Empty Can test: R -  External Rotation Resistance: R -  Ross-Juan F Impingement Sign: R -  Palpable Tenderness Infraspinatus and Supraspinatus: R -     Long Head Biceps Tendinopathy:   Speed Test: R -  Yergason's Test: R -     Rotator Cuff Tears:   IR Lag Sign: R -     AC Compression: R -, L -    Assessment: The pt had increased irritability and worsening pain with several activities today that were previously pain free. Will reassess objective measures next session to ensure continued progress and update POC.     Goals:   Pt will report improved ability to sleep without waking due to shoulder pain   Pt will improve shoulder flexion AROM to >150 degrees to be able to reach into overhead cabinets without pain or restriction   Pt will improve shoulder abduction AROM to >150 degrees to improve ability to don deodorant, don/doff shirts, and wash hair   Pt will improve shoulder strength throughout to 4/5 to improve function with pushing, yardwork, and heavy lifting   Pt will demonstrate increased mid/low trap strength to 4/5 to promote improved shoulder mechanics and stabilization with lifting and reaching   Pt will be independent and compliant with comprehensive HEP to maintain progress achieved in PT     Plan: Patient will be seen for 2 x/week or a total of 10 visits over a 90 day period. Treatment will include: Manual Therapy, Neuromuscular Re-education, Therapeutic Activities, Therapeutic Exercise and Home Exercise Program instruction.  Add tricep strengthening  Date: 6/8/2023  TX#: 2/10 Date: 6/13/2023             TX#: 3/10 Date: 6/15/2023             TX#: 4/10 Date: 6/20/2023             TX#: 5/10 Date: 6/22/2023  Tx#: 6/10 Date: 6/28/2023  Tx#: 7/10 Date: 6/30/2023  Tx#: 8/10 Date: 7/5/2023  Tx#: 9/10   MT 25 min  Gr III R GH AP, Inf mobilization   R shoulder PROM IR, ER  MT 30 min  Gr III R GH AP, Inf mobilization  R shoulder PROM IR, ER  MT 25 min  Gr III R GH AP, Inf mobilization  R shoulder PROM IR, ER  MT 25 min  Gr III R GH AP, Inf mobilization  R shoulder PROM IR, ER  MT 25 min  Gr III R GH AP, Inf mobilization  R shoulder PROM IR, ER  MT 25 min  Gr III R GH AP, Inf mobilization  R shoulder PROM IR, ER, flex, abd MT 25 min  Gr III R GH AP, Inf mobilization  R shoulder PROM IR, ER, flex, abd MT 18 min  Gr III R GH AP, Inf mobilization  R shoulder PROM IR, ER, flex, abd   TE 16 min  Supine serratus punches x10, 3 lbs x20 R  Sidelying ER x15 R  Sidelying abd x10 DC'd due to painful crepitus R  Shoulder walkouts at 90 deg flex green tb x10 R  Standing punch/chest press green tb x30 R  Wall slides x30 B  Shoulder HABD red tb x20 B TE 10 min  Pulleys 3 min ea flex, abd  Sleeper stretch x30  Wall slides x30 B   TE 20 min  Pulleys 3 min ea flex, abd  Sleeper stretch x30  Wall push ups x10  Wall slides x30 B  Shoulder ER red tb x20  Shoulder IR red tb x20  Finger ladder in sitting scaption x20 TE 20 min  Pulleys 3 min ea flex, abd  Sleeper stretch x30  Supine cane AAROM B shoulder flexion 2 lbs x30  Push ups on elevated table 2x5  Prone MT x20 R  Prone LT x20 R  Shoulder ER red tb x20  Shoulder IR red tb x20  Finger ladder in sitting scaption x20 TE 20 min  Pulleys 3 min ea flex, abd  Supine cane AAROM B shoulder flexion 2 lbs x30  UBE 2 min fwd, 2 min retro  Shoulder ER red tb x20  Shoulder IR red tb x20  High rows green tb x20 B  Fwd punches red tb x25 R with cues for mid range motion only  Finger ladder in sitting scaption x20 TE 20 min  Pulleys 3 min ea flex, abd  UBE 2 min fwd, 2 min retro  Fwd punches red tb x25 R with cues for mid range motion only  Pallof press green tb 2x30 R with lateral then medial anchor  Finger ladder in sitting scaption x20 TE 20 min  Pulleys 3 min ea flex, abd  UBE 2 min fwd, 2 min retro  Fwd punches red tb x25 R with cues for mid range motion only  Pallof press green tb 2x20 R with lateral then medial anchor  Finger ladder in sitting scaption x5  Walkouts blue tb x8 ER and IR R TE 25 min  Pulleys 3 min ea flex, abd  UBE 2 min fwd RUE, 2 min retro BUE, 1 min retro RUE  Fwd punches red tb x10 R with cues for mid range motion only, DC'd due to pain throughout range  Pallof press green tb x20 R with medial anchor, lateral anchor DC'd due to pain  Finger ladder in sitting scaption x5  Walkouts blue tb x10 ER  R  Shoulder ER green tb x20  Shoulder IR green tb x20  Alphabet on wall 4 lb MB                       HEP: wall slides, serratus punches       Charges: MTx1 TEx2       Total Timed Treatment: 43 min  Total Treatment Time: 43 min

## 2023-07-07 ENCOUNTER — OFFICE VISIT (OUTPATIENT)
Dept: PHYSICAL THERAPY | Facility: HOSPITAL | Age: 78
End: 2023-07-07
Attending: ORTHOPAEDIC SURGERY
Payer: MEDICARE

## 2023-07-07 PROCEDURE — 97140 MANUAL THERAPY 1/> REGIONS: CPT

## 2023-07-07 PROCEDURE — 97110 THERAPEUTIC EXERCISES: CPT

## 2023-07-07 NOTE — PROGRESS NOTES
Discharge Summary  Pt has attended 10 visits in Physical Therapy. Diagnosis:   Right shoulder pain, unspecified chronicity (M25.511)  Right rotator cuff tear arthropathy (M75.101,M12.811)  Primary osteoarthritis of right shoulder (M19.011)       Referring Provider: Pa Sanchez  Date of Evaluation:    6/6/2023    Precautions:  Cancer Next MD visit:   none scheduled  Date of Surgery: n/a   Insurance Primary/Secondary: MEDICARE / [facility]     # Auth Visits: 10            Subjective: The pt reports he is a little sore from activities yesterday. States the shoulder is about the same and is comfortable with DC at this time. Pain: 0/10 at rest, 2/10 with movement      Objective:   Observation/Posture: RTC atrophy, elevated clavicle, R scap depressed and in IR  Palpation: No TTP R shoulder    AROM: (* denotes performed with pain)  Shoulder  Elbow PROM   Flexion: R 150; L 140  Abduction: R 151; L 137  ER: R T3; L T3  IR: R T5; L T6 Flexion: R WFL; L WFL  Extension: R WFL; L WFL Flexion: R 156*  Abduction: R 153  ER: R 76  IR: R 65      Accessory motion: GH mobility B hypomobile worse on the R     Strength/MMT: (* denotes performed with pain)  Shoulder Elbow Scapular   Flexion: R 4/5*; L 5/5  Abduction: R 3+/5; L 5/5  ER: R 4/5; L 4+/5  IR: R 5/5; L 5/5 Flexion: R 5/5; L 5/5  Extension: R 5/5; L 5/5 Mid trap: R 4+/5; L 5/5   Low trap: R 4/5; L 4+/5  Serratus: R 5/5; L 5/5      Special tests:   Labrum Special Testing:   Compression Rotation Test: R -     Subacromial Pain Syndrome:  Empty Can test: R -  External Rotation Resistance: R -  Ross-Juan F Impingement Sign: R -  Palpable Tenderness Infraspinatus and Supraspinatus: R -     Long Head Biceps Tendinopathy:   Speed Test: R +  Yergason's Test: R -     Rotator Cuff Tears:   IR Lag Sign: R -     AC Compression: R -, L -    Assessment: The pt presents with improved R shoulder ROM and strength but continued pain and crepitus with movement.  Despite gains in ROM and strength, this has not improved movement patterns or increased functional tolerance, and resisted ther ex may additionally be contributing to increased levels of irritation. Due to lack of functional progress, skilled PT is no longer indicated at this time. The pt is independent with his HEP for maintenance of ROM and strength gains.     Goals:   Pt will report improved ability to sleep without waking due to shoulder pain MET  Pt will improve shoulder flexion AROM to >150 degrees to be able to reach into overhead cabinets without pain or restriction MET  Pt will improve shoulder abduction AROM to >150 degrees to improve ability to don deodorant, don/doff shirts, and wash hair MET  Pt will improve shoulder strength throughout to 4/5 to improve function with pushing, yardwork, and heavy lifting PARTIALLY MET  Pt will demonstrate increased mid/low trap strength to 4/5 to promote improved shoulder mechanics and stabilization with lifting and reaching MET  Pt will be independent and compliant with comprehensive HEP to maintain progress achieved in PT MET    Plan: DC to HEP  Date: 6/8/2023  TX#: 2/10 Date: 6/13/2023             TX#: 3/10 Date: 6/15/2023             TX#: 4/10 Date: 6/20/2023             TX#: 5/10 Date: 6/22/2023  Tx#: 6/10 Date: 6/28/2023  Tx#: 7/10 Date: 6/30/2023  Tx#: 8/10 Date: 7/5/2023  Tx#: 9/10 Date: 7/7/2023  Tx#: 10/10   MT 25 min  Gr III R GH AP, Inf mobilization   R shoulder PROM IR, ER  MT 30 min  Gr III R GH AP, Inf mobilization  R shoulder PROM IR, ER  MT 25 min  Gr III R GH AP, Inf mobilization  R shoulder PROM IR, ER  MT 25 min  Gr III R GH AP, Inf mobilization  R shoulder PROM IR, ER  MT 25 min  Gr III R GH AP, Inf mobilization  R shoulder PROM IR, ER  MT 25 min  Gr III R GH AP, Inf mobilization  R shoulder PROM IR, ER, flex, abd MT 25 min  Gr III R GH AP, Inf mobilization  R shoulder PROM IR, ER, flex, abd MT 18 min  Gr III R GH AP, Inf mobilization  R shoulder PROM IR, ER, flex, abd MT 18 min  Gr III R GH AP, Inf mobilization  R shoulder PROM IR, ER, flex, abd   TE 16 min  Supine serratus punches x10, 3 lbs x20 R  Sidelying ER x15 R  Sidelying abd x10 DC'd due to painful crepitus R  Shoulder walkouts at 90 deg flex green tb x10 R  Standing punch/chest press green tb x30 R  Wall slides x30 B  Shoulder HABD red tb x20 B TE 10 min  Pulleys 3 min ea flex, abd  Sleeper stretch x30  Wall slides x30 B   TE 20 min  Pulleys 3 min ea flex, abd  Sleeper stretch x30  Wall push ups x10  Wall slides x30 B  Shoulder ER red tb x20  Shoulder IR red tb x20  Finger ladder in sitting scaption x20 TE 20 min  Pulleys 3 min ea flex, abd  Sleeper stretch x30  Supine cane AAROM B shoulder flexion 2 lbs x30  Push ups on elevated table 2x5  Prone MT x20 R  Prone LT x20 R  Shoulder ER red tb x20  Shoulder IR red tb x20  Finger ladder in sitting scaption x20 TE 20 min  Pulleys 3 min ea flex, abd  Supine cane AAROM B shoulder flexion 2 lbs x30  UBE 2 min fwd, 2 min retro  Shoulder ER red tb x20  Shoulder IR red tb x20  High rows green tb x20 B  Fwd punches red tb x25 R with cues for mid range motion only  Finger ladder in sitting scaption x20 TE 20 min  Pulleys 3 min ea flex, abd  UBE 2 min fwd, 2 min retro  Fwd punches red tb x25 R with cues for mid range motion only  Pallof press green tb 2x30 R with lateral then medial anchor  Finger ladder in sitting scaption x20 TE 20 min  Pulleys 3 min ea flex, abd  UBE 2 min fwd, 2 min retro  Fwd punches red tb x25 R with cues for mid range motion only  Pallof press green tb 2x20 R with lateral then medial anchor  Finger ladder in sitting scaption x5  Walkouts blue tb x8 ER and IR R TE 25 min  Pulleys 3 min ea flex, abd  UBE 2 min fwd RUE, 2 min retro BUE, 1 min retro RUE  Fwd punches red tb x10 R with cues for mid range motion only, DC'd due to pain throughout range  Pallof press green tb x20 R with medial anchor, lateral anchor DC'd due to pain  Finger ladder in sitting scaption x5  Walkouts blue tb x10 ER  R  Shoulder ER green tb x20  Shoulder IR green tb x20  Alphabet on wall 4 lb MB TE 25 min  Pulleys 3 min ea flex, abd  Reassessment  Wall slides with lift off  Pec stretch in doorway  Walkouts green tb ER  R  Shoulder ER green tb   Shoulder IR green tb                         HEP: wall slides with lift off, doorway stretch serratus punches, shoulder IR/ER with tb, pec stretch, walkouts ER       Charges: MTx1 TEx2       Total Timed Treatment: 43 min  Total Treatment Time: 43 min

## 2023-07-12 ENCOUNTER — APPOINTMENT (OUTPATIENT)
Dept: PHYSICAL THERAPY | Facility: HOSPITAL | Age: 78
End: 2023-07-12
Attending: ORTHOPAEDIC SURGERY
Payer: MEDICARE

## 2023-07-14 ENCOUNTER — APPOINTMENT (OUTPATIENT)
Dept: PHYSICAL THERAPY | Facility: HOSPITAL | Age: 78
End: 2023-07-14
Attending: ORTHOPAEDIC SURGERY
Payer: MEDICARE

## 2023-07-17 ENCOUNTER — APPOINTMENT (OUTPATIENT)
Dept: PHYSICAL THERAPY | Facility: HOSPITAL | Age: 78
End: 2023-07-17
Attending: ORTHOPAEDIC SURGERY
Payer: MEDICARE

## 2023-07-21 ENCOUNTER — APPOINTMENT (OUTPATIENT)
Dept: PHYSICAL THERAPY | Facility: HOSPITAL | Age: 78
End: 2023-07-21
Attending: ORTHOPAEDIC SURGERY
Payer: MEDICARE

## 2023-07-24 ENCOUNTER — APPOINTMENT (OUTPATIENT)
Dept: PHYSICAL THERAPY | Facility: HOSPITAL | Age: 78
End: 2023-07-24
Attending: ORTHOPAEDIC SURGERY
Payer: MEDICARE

## 2023-07-25 ENCOUNTER — APPOINTMENT (OUTPATIENT)
Dept: PHYSICAL THERAPY | Facility: HOSPITAL | Age: 78
End: 2023-07-25
Attending: ORTHOPAEDIC SURGERY
Payer: MEDICARE

## 2023-07-28 ENCOUNTER — APPOINTMENT (OUTPATIENT)
Dept: PHYSICAL THERAPY | Facility: HOSPITAL | Age: 78
End: 2023-07-28
Attending: ORTHOPAEDIC SURGERY
Payer: MEDICARE

## 2023-09-07 ENCOUNTER — OFFICE VISIT (OUTPATIENT)
Dept: OTOLARYNGOLOGY | Facility: CLINIC | Age: 78
End: 2023-09-07

## 2023-09-07 VITALS — HEIGHT: 75 IN | WEIGHT: 197 LBS | BODY MASS INDEX: 24.49 KG/M2

## 2023-09-07 DIAGNOSIS — H61.23 BILATERAL IMPACTED CERUMEN: Primary | ICD-10-CM

## 2023-11-21 ENCOUNTER — TELEPHONE (OUTPATIENT)
Dept: INTERNAL MEDICINE CLINIC | Facility: CLINIC | Age: 78
End: 2023-11-21

## 2023-11-21 DIAGNOSIS — E55.9 VITAMIN D DEFICIENCY: ICD-10-CM

## 2023-11-21 DIAGNOSIS — Z00.00 PHYSICAL EXAM: Primary | ICD-10-CM

## 2023-11-21 DIAGNOSIS — Z12.5 SCREENING PSA (PROSTATE SPECIFIC ANTIGEN): ICD-10-CM

## 2023-11-21 NOTE — TELEPHONE ENCOUNTER
Patient is scheduled on 11/27 for a Medicare Annual, patient is requesting an order for blood work to be entered  Patient is requesting a PSA & Lipid Panel    Patient wants to have labs drawn on 11/22.     Please enter a message in [employer] once the order has been entered

## 2023-11-21 NOTE — TELEPHONE ENCOUNTER
Lab order completed, nursing please call patient, let them know to complete the labs 12 hours fasting, to be done 7 days prior to their visit of possible    -Let him know he is due for a full panel of lab work

## 2023-11-22 ENCOUNTER — LAB ENCOUNTER (OUTPATIENT)
Dept: LAB | Facility: HOSPITAL | Age: 78
End: 2023-11-22
Attending: INTERNAL MEDICINE
Payer: MEDICARE

## 2023-11-22 DIAGNOSIS — Z12.5 SCREENING PSA (PROSTATE SPECIFIC ANTIGEN): ICD-10-CM

## 2023-11-22 DIAGNOSIS — E55.9 VITAMIN D DEFICIENCY: ICD-10-CM

## 2023-11-22 DIAGNOSIS — Z00.00 PHYSICAL EXAM: ICD-10-CM

## 2023-11-22 LAB
ALBUMIN SERPL-MCNC: 4.1 G/DL (ref 3.2–4.8)
ALBUMIN/GLOB SERPL: 1.5 {RATIO} (ref 1–2)
ALP LIVER SERPL-CCNC: 95 U/L
ALT SERPL-CCNC: 29 U/L
ANION GAP SERPL CALC-SCNC: 5 MMOL/L (ref 0–18)
AST SERPL-CCNC: 29 U/L (ref ?–34)
BASOPHILS # BLD AUTO: 0.06 X10(3) UL (ref 0–0.2)
BASOPHILS NFR BLD AUTO: 0.9 %
BILIRUB SERPL-MCNC: 1.2 MG/DL (ref 0.2–1.1)
BILIRUB UR QL: NEGATIVE
BUN BLD-MCNC: 18 MG/DL (ref 9–23)
BUN/CREAT SERPL: 15.9 (ref 10–20)
CALCIUM BLD-MCNC: 9.3 MG/DL (ref 8.7–10.4)
CHLORIDE SERPL-SCNC: 109 MMOL/L (ref 98–112)
CHOLEST SERPL-MCNC: 154 MG/DL (ref ?–200)
CLARITY UR: CLEAR
CO2 SERPL-SCNC: 28 MMOL/L (ref 21–32)
COMPLEXED PSA SERPL-MCNC: 2.01 NG/ML (ref ?–4)
CREAT BLD-MCNC: 1.13 MG/DL
DEPRECATED RDW RBC AUTO: 46.9 FL (ref 35.1–46.3)
EGFRCR SERPLBLD CKD-EPI 2021: 67 ML/MIN/1.73M2 (ref 60–?)
EOSINOPHIL # BLD AUTO: 0.33 X10(3) UL (ref 0–0.7)
EOSINOPHIL NFR BLD AUTO: 4.9 %
ERYTHROCYTE [DISTWIDTH] IN BLOOD BY AUTOMATED COUNT: 13.2 % (ref 11–15)
FASTING PATIENT LIPID ANSWER: YES
FASTING STATUS PATIENT QL REPORTED: YES
GLOBULIN PLAS-MCNC: 2.8 G/DL (ref 2.8–4.4)
GLUCOSE BLD-MCNC: 95 MG/DL (ref 70–99)
GLUCOSE UR-MCNC: NORMAL MG/DL
HCT VFR BLD AUTO: 44.7 %
HDLC SERPL-MCNC: 48 MG/DL (ref 40–59)
HGB BLD-MCNC: 15.5 G/DL
HGB UR QL STRIP.AUTO: NEGATIVE
IMM GRANULOCYTES # BLD AUTO: 0.02 X10(3) UL (ref 0–1)
IMM GRANULOCYTES NFR BLD: 0.3 %
KETONES UR-MCNC: NEGATIVE MG/DL
LDLC SERPL CALC-MCNC: 89 MG/DL (ref ?–100)
LEUKOCYTE ESTERASE UR QL STRIP.AUTO: NEGATIVE
LYMPHOCYTES # BLD AUTO: 1.57 X10(3) UL (ref 1–4)
LYMPHOCYTES NFR BLD AUTO: 23.5 %
MCH RBC QN AUTO: 33.2 PG (ref 26–34)
MCHC RBC AUTO-ENTMCNC: 34.7 G/DL (ref 31–37)
MCV RBC AUTO: 95.7 FL
MONOCYTES # BLD AUTO: 0.83 X10(3) UL (ref 0.1–1)
MONOCYTES NFR BLD AUTO: 12.4 %
NEUTROPHILS # BLD AUTO: 3.86 X10 (3) UL (ref 1.5–7.7)
NEUTROPHILS # BLD AUTO: 3.86 X10(3) UL (ref 1.5–7.7)
NEUTROPHILS NFR BLD AUTO: 58 %
NITRITE UR QL STRIP.AUTO: NEGATIVE
NONHDLC SERPL-MCNC: 106 MG/DL (ref ?–130)
OSMOLALITY SERPL CALC.SUM OF ELEC: 296 MOSM/KG (ref 275–295)
PH UR: 6.5 [PH] (ref 5–8)
PLATELET # BLD AUTO: 266 10(3)UL (ref 150–450)
POTASSIUM SERPL-SCNC: 4.3 MMOL/L (ref 3.5–5.1)
PROT SERPL-MCNC: 6.9 G/DL (ref 5.7–8.2)
PROT UR-MCNC: NEGATIVE MG/DL
RBC # BLD AUTO: 4.67 X10(6)UL
SODIUM SERPL-SCNC: 142 MMOL/L (ref 136–145)
SP GR UR STRIP: 1.01 (ref 1–1.03)
TRIGL SERPL-MCNC: 89 MG/DL (ref 30–149)
TSI SER-ACNC: 3.95 MIU/ML (ref 0.55–4.78)
UROBILINOGEN UR STRIP-ACNC: NORMAL
VIT D+METAB SERPL-MCNC: 56.2 NG/ML (ref 30–100)
VLDLC SERPL CALC-MCNC: 14 MG/DL (ref 0–30)
WBC # BLD AUTO: 6.7 X10(3) UL (ref 4–11)

## 2023-11-22 PROCEDURE — 80053 COMPREHEN METABOLIC PANEL: CPT

## 2023-11-22 PROCEDURE — 82306 VITAMIN D 25 HYDROXY: CPT

## 2023-11-22 PROCEDURE — 84443 ASSAY THYROID STIM HORMONE: CPT

## 2023-11-22 PROCEDURE — 36415 COLL VENOUS BLD VENIPUNCTURE: CPT

## 2023-11-22 PROCEDURE — 85025 COMPLETE CBC W/AUTO DIFF WBC: CPT

## 2023-11-22 PROCEDURE — 81003 URINALYSIS AUTO W/O SCOPE: CPT | Performed by: INTERNAL MEDICINE

## 2023-11-22 PROCEDURE — 80061 LIPID PANEL: CPT

## 2023-11-27 ENCOUNTER — OFFICE VISIT (OUTPATIENT)
Dept: INTERNAL MEDICINE CLINIC | Facility: CLINIC | Age: 78
End: 2023-11-27

## 2023-11-27 VITALS
BODY MASS INDEX: 25.16 KG/M2 | DIASTOLIC BLOOD PRESSURE: 74 MMHG | SYSTOLIC BLOOD PRESSURE: 120 MMHG | OXYGEN SATURATION: 98 % | HEIGHT: 75.1 IN | TEMPERATURE: 98 F | HEART RATE: 67 BPM | WEIGHT: 202.38 LBS

## 2023-11-27 DIAGNOSIS — Z00.00 ENCOUNTER FOR ANNUAL HEALTH EXAMINATION: Primary | ICD-10-CM

## 2023-11-27 DIAGNOSIS — H54.7 VISION LOSS: ICD-10-CM

## 2023-11-27 DIAGNOSIS — Z85.038 HISTORY OF COLON CANCER: ICD-10-CM

## 2023-11-27 DIAGNOSIS — E78.1 PURE HYPERTRIGLYCERIDEMIA: ICD-10-CM

## 2023-11-27 DIAGNOSIS — L57.0 ACTINIC KERATOSIS: ICD-10-CM

## 2023-11-27 DIAGNOSIS — H35.30 MACULAR DEGENERATION OF BOTH EYES, UNSPECIFIED TYPE: ICD-10-CM

## 2023-11-27 DIAGNOSIS — H26.9 CATARACT OF BOTH EYES, UNSPECIFIED CATARACT TYPE: ICD-10-CM

## 2023-11-27 DIAGNOSIS — N39.41 URGE INCONTINENCE: ICD-10-CM

## 2023-11-27 PROCEDURE — G0439 PPPS, SUBSEQ VISIT: HCPCS | Performed by: INTERNAL MEDICINE

## 2023-11-27 PROCEDURE — 99214 OFFICE O/P EST MOD 30 MIN: CPT | Performed by: INTERNAL MEDICINE

## 2023-11-27 PROCEDURE — 93000 ELECTROCARDIOGRAM COMPLETE: CPT | Performed by: INTERNAL MEDICINE

## 2023-11-27 PROCEDURE — 1126F AMNT PAIN NOTED NONE PRSNT: CPT | Performed by: INTERNAL MEDICINE

## 2023-11-27 NOTE — PATIENT INSTRUCTIONS
1. Encounter for annual health examination  Physical exam instruction: Improve diet and exercise  - ELECTROCARDIOGRAM, COMPLETE: Sinus rhythm with first-degree AV block at 60 bpm, right bundle branch block    2. Actinic keratosis  Stable cont monitoring and management  - Detailed, Mod Complex (20107)    3. Pure hypertriglyceridemia  Stable cont monitoring and management  Stay on the same statin medications for now, we can  with a calcium scoring CT how aggressive we need to be with these medications. - Detailed, Mod Complex (50032)  - CT CALCIUM SCORING; Future    4. History of colon cancer  Stable continue current monitoring management  - Detailed, Mod Complex (24977)    5. Macular degeneration of both eyes, unspecified type  Stable continue with current specialist visits, monitoring management, I like the idea of you being aggressive with preserving her eyesight  - Detailed, Mod Complex (66276)    6. Urge incontinence  Stable continue current monitoring management  - Detailed, Mod Complex (60106)    7. Vision loss  Stable continue current monitoring management  - Detailed, Mod Complex (78114)    8.  Cataract of both eyes, unspecified cataract type  Stable continue current monitoring management  - Detailed, Mod Complex (56937)

## 2023-11-28 LAB
ATRIAL RATE: 60 BPM
P AXIS: 57 DEGREES
P-R INTERVAL: 224 MS
Q-T INTERVAL: 418 MS
QRS DURATION: 138 MS
QTC CALCULATION (BEZET): 418 MS
R AXIS: 43 DEGREES
T AXIS: 46 DEGREES
VENTRICULAR RATE: 60 BPM

## 2023-12-15 ENCOUNTER — OFFICE VISIT (OUTPATIENT)
Dept: DERMATOLOGY CLINIC | Facility: CLINIC | Age: 78
End: 2023-12-15
Payer: MEDICARE

## 2023-12-15 DIAGNOSIS — L57.0 AK (ACTINIC KERATOSIS): Primary | ICD-10-CM

## 2023-12-15 DIAGNOSIS — L81.4 LENTIGO: ICD-10-CM

## 2023-12-15 DIAGNOSIS — L82.1 OTHER SEBORRHEIC KERATOSIS: ICD-10-CM

## 2023-12-15 DIAGNOSIS — D22.9 MULTIPLE NEVI: ICD-10-CM

## 2023-12-15 DIAGNOSIS — D23.9 BENIGN NEOPLASM OF SKIN, UNSPECIFIED LOCATION: ICD-10-CM

## 2023-12-15 DIAGNOSIS — Z85.828 HISTORY OF NONMELANOMA SKIN CANCER: ICD-10-CM

## 2023-12-24 NOTE — PROGRESS NOTES
Levy Bah is a 69year old male. HPI:     CC:    Chief Complaint   Patient presents with    Full Skin Exam     LOV 23;  pt presenting for full body skin exam.  Hx of AK, BCC. Pt notes spots of concern near ear, one above and one below, notes spots are itchy          Allergies:  Pollen and Tramadol    HISTORY:    Past Medical History:   Diagnosis Date    Actinic keratosis     left medial cheek    BCC (basal cell carcinoma) 2021    Face - left lateral cheek    BCC (basal cell carcinoma) 2021    Right temple    BCC (basal cell carcinoma) 2021    Right extensor forearm    Colon carcinoma ([address]     Family history of heart disease     Family h/o ASHD    High cholesterol     History of hydrocelectomy     Inguinal hernia     Left eye injury          Other and unspecified hyperlipidemia     Poison ivy     Skin cancer, basal cell     R chest ; L lat [facility]       Past Surgical History:   Procedure Laterality Date    CHEMOTHERAPY      COLECTOMY      colon CA    COLONOSCOPY  2012, , , , 3/02, 2000    Dr. Leverne Phoenix    COLONOSCOPY N/A 2017    Procedure: COLONOSCOPY;  Surgeon: Chadwick Urban MD;  Location: [address] [place] [address] ENDOSCOPY    EGD  2008, 3/01    Dr. Remy Burkett History   Problem Relation Age of Onset    Stroke Father [de-identified]    Breast Cancer Mother 66        transitioned to ovarian       Social History     Socioeconomic History    Marital status:    Tobacco Use    Smoking status: Former     Packs/day: 1.00     Years: 15.00     Additional pack years: 0.00     Total pack years: 15.00     Types: Cigarettes     Quit date: 6/15/1985     Years since quittin.5     Passive exposure: Past    Smokeless tobacco: Never   Vaping Use    Vaping Use: Never used   Substance and Sexual Activity    Alcohol use: Yes     Alcohol/week: 7.0 standard drinks of alcohol     Types: 7 Cans of beer per week     Comment:  Occ    Drug use: No   Other Topics Concern    Caffeine Concern Yes     Comment: Coffee 4 cups daily    Grew up on a farm No    History of tanning No    Outdoor occupation No    Pt has a pacemaker No    Pt has a defibrillator No    Reaction to local anesthetic No        Current Outpatient Medications   Medication Sig Dispense Refill    fluorouracil 5 % External Cream Use twice weekly at night as directed x 6 weeks 40 g 1    simvastatin 20 MG Oral Tab Take 0.5 tablets (10 mg total) by mouth nightly. 45 tablet 3    tamsulosin 0.4 MG Oral Cap Take 1 capsule (0.4 mg total) by mouth nightly. 90 capsule 3    Ascorbic Acid (VITAMIN C) 100 MG Oral Tab Take 1 tablet (100 mg total) by mouth daily. fluticasone propionate 50 MCG/ACT Nasal Suspension 2 sprays by Each Nare route 2 (two) times daily. (Patient taking differently: 2 sprays by Each Nare route as needed.) 2 each 5    Cholecalciferol (VITAMIN D3) 25 MCG Oral Tab Take 1 tablet (1,000 Units total) by mouth daily. Multiple Vitamins-Minerals (PRESERVISION AREDS) Oral Tab Take 2 tablets by mouth daily. Glucosamine HCl-MSM (GLUCOSAMINE-MSM) 1500-500 MG/30ML Oral Liquid Take 2 tablets by mouth daily. loratadine 10 MG Oral Tab Take 1 tablet (10 mg total) by mouth daily as needed. Multiple Vitamin (MULTI-VITAMINS) Oral Tab Take 1 tablet by mouth daily. Coenzyme Q10 (COQ-10) 100 MG Oral Cap Take 1 capsule by mouth daily. Allergies:    Allergies   Allergen Reactions    Pollen      Seasonal allergies    Tramadol UNKNOWN       Past Medical History:   Diagnosis Date    Actinic keratosis 2023    left medial cheek    BCC (basal cell carcinoma) 03/2021    Face - left lateral cheek    BCC (basal cell carcinoma) 11/2021    Right temple    BCC (basal cell carcinoma) 11/2021    Right extensor forearm    Colon carcinoma ([facility] [address] 2000    Family history of heart disease     Family h/o ASHD    High cholesterol     History of hydrocelectomy 2010    Inguinal hernia     Left eye injury      Other and unspecified hyperlipidemia     Poison ivy     Skin cancer, basal cell     R chest ; L lat [facility]      Past Surgical History:   Procedure Laterality Date    CHEMOTHERAPY      COLECTOMY      colon CA    COLONOSCOPY  2012, , , , 3/02, 2000    Dr. Sheila Ryan    COLONOSCOPY N/A 2017    Procedure: COLONOSCOPY;  Surgeon: Alek Caceres MD;  Location: [facility] ENDOSCOPY    EGD  2008, 3/01    Dr. Julia Rehman History     Socioeconomic History    Marital status:      Spouse name: Not on file    Number of children: Not on file    Years of education: Not on file    Highest education level: Not on file   Occupational History    Not on file   Tobacco Use    Smoking status: Former     Packs/day: 1.00     Years: 15.00     Additional pack years: 0.00     Total pack years: 15.00     Types: Cigarettes     Quit date: 6/15/1985     Years since quittin.5     Passive exposure: Past    Smokeless tobacco: Never   Vaping Use    Vaping Use: Never used   Substance and Sexual Activity    Alcohol use: Yes     Alcohol/week: 7.0 standard drinks of alcohol     Types: 7 Cans of beer per week     Comment:  Occ    Drug use: No    Sexual activity: Not on file   Other Topics Concern     Service Not Asked    Blood Transfusions Not Asked    Caffeine Concern Yes     Comment: Coffee 4 cups daily    Occupational Exposure Not Asked    Hobby Hazards Not Asked    Sleep Concern Not Asked    Stress Concern Not Asked    Weight Concern Not Asked    Special Diet Not Asked    Back Care Not Asked    Exercise Not Asked    Bike Helmet Not Asked    Seat Belt Not Asked    Self-Exams Not Asked    Grew up on a farm No    History of tanning No    Outdoor occupation No    Pt has a pacemaker No    Pt has a defibrillator No    Reaction to local anesthetic No   Social History Narrative    Not on file     Social Determinants of Health     Financial Resource Strain: Not on file   Food Insecurity: Not on file   Transportation Needs: Not on file   Physical Activity: Not on file   Stress: Not on file   Social Connections: Not on file   Housing Stability: Not on file     Family History   Problem Relation Age of Onset    Stroke Father [de-identified]    Breast Cancer Mother 66        transitioned to ovarian        There were no vitals filed for this visit. HPI:    Chief Complaint   Patient presents with    Full Skin Exam     LOV 6/14/23;  pt presenting for full body skin exam.  Hx of AK, BCC. Pt notes spots of concern near ear, one above and one below, notes spots are itchy      Patient here for routine follow-up AK's BCC, biopsy left cheek 1/23 hypertrophic AK and folliculitis    Patient notes previously diagnosed with a \"freckle in the back of his eye\" followed by ophthalmology. Unchanged per patient. BCC left cheek post Mohs surgery history of BCC right chest left lateral canthus [id number]    He has been careful to wear sunscreen and hat. Patient presents with concerns above. Past notes/ records and appropriate/relevant lab results including pathology and past body maps reviewed. Updated and new information noted in current visit. Patient has been in their usual state of health. History, medications, allergies reviewed as noted. ROS:  Denies any other systemic complaints. No new or changeing lesions other than noted above. No fevers, chills, night sweats, unusual sun sensitivity. No other skin complaints. History, medications, allergies reviewed as noted. Physical Examination:     Well-developed well-nourished patient alert oriented in no acute distress. Exam total-body performed, including scalp, head, neck, face,nails, hair, external eyes, including conjunctival mucosa, eyelids, lips external ears, back, chest,/ breasts, axillae,  abdomen, arms, legs, palms.      Multiple light to medium brown, well marginated, uniformly pigmented, macules and papules 6 mm and less scattered on exam. pigmented lesions examined with dermoscopy benign-appearing patterns. Waxy tannish keratotic papules scattered, cherry-red vascular papules scattered. See map today's date for lesions noted . Otherwise remarkable for lesions as noted on map. See Assessment /Plan for additional history and physical exam also:    Assessment / plan:    No orders of the defined types were placed in this encounter. Meds & Refills for this Visit:  Requested Prescriptions      No prescriptions requested or ordered in this encounter         Encounter Diagnoses   Name Primary? AK (actinic keratosis) Yes    Other seborrheic keratosis     Multiple nevi     Benign neoplasm of skin, unspecified location     Lentigo     History of nonmelanoma skin cancer        Left medial cheek AK, folliculitis 0/94    Erythematous scaling keratotic papules noted at sites noted on map  Actinic Keratoses. Precancerous nature discussed. Sun protection, sunscreen/ blocks encouraged Lesions treated with cryo- . Biopsy if not resolved. Left lateral jawline    Plan to treat areas over the forehead brows left lateral temple preauricular cheek left mid cheek along incision site from prior Mohs, left nasal sidewall  Areas treated previously improved  [place] area along the left jawline cheek  Actinic keratoses. Precancerous nature discussed. Treatment options reviewed at length we will proceed with topical therapy with Efudex twice weekly for   6   week course  of actual medication use and may alternate weeks if necessary. Increased redness scaling crusting irritation pain tenderness potential discussed. Anticipate one month for resolution of symptoms. Plan recheck in one month after completion of treatment course. Consider alternative treatment biopsy if not resolved. Careful sun protection while outdoors sailing.   Had both his own sailboat is now enjoying sailing outdoors a great deal encourage sun protection    Extensive lentigines, no other suspicious lesions back chest with extensive sun damage lentigines no new suspicious lesions    BCC right temple post Mohs 2021 healing well  BCC 11/21 post cautery healed well    Patient with [address] left lateral cheek post Mohs. With Dr. Richard Reading hypertrophic scarring has improved treat with intralesional Kenalog    Extensive benign seborrheic keratosis her shoulders upper back numerous lentigines  Waxy tan keratotic papules lesions in areas of concern as noted reassurance given. Benign nature discussed. Possibility of cryo, alphahydroxy acids over-the-counter retinol's discussed. Nails with onychomycotic changes   Continue over-the-counter products     patient concerned that cryo had caused BCC to spread  Darker lentigo right distal forearm prior biopsy BCC near observe carefully    History of Efudex, right anterior scalp. Improved consider repeat course    Right eyelid lesion waxy tan papule likely benign keratosis continue careful monitoring follow-up with ophthalmology for freckle on the back of the eye. Oculoplastics plastics if necessary. See ophthalmology regarding lesions along the eyelash margin      Nevi unchanged. Extensive lesions no new suspicious lesions. AK's discussed maintenance Efudex therapy once every 1-2 weeks. And as needed ask 2-3 weeks to individual spots overall this has been helpful. Refill if needed continue as needed. Significant improvement. Will repeat course of Efudex to forehead temples nasal dorsum after the holidays continue careful sun protection. Continue Efudex      Lesion at left knee darker brown macule longstanding unchanged    No recurrence of previous atypical nevi    No other susupicious lesions on todays  exam.  Please refer to map for specific lesions. See additional diagnoses. Pros cons of various therapies, risks benefits discussed. Pathophysiology discussed with patient. Therapeutic options reviewed.   See Medications in grid. Instructions reviewed at length. Benign nevi, seborrheic  keratoses, cherry angiomas:  Reassurance regarding other benign skin lesions. Signs and symptoms of skin cancer, ABCDE's of melanoma discussed with patient. Sunscreen use, sun protection, self exams reviewed. Followup as noted RTC routine checkup 6 mos - one year or p.r.n. Follow-up 6 months or as needed    The patient indicates understanding of these issues and agrees to the plan. The patient is asked to return as noted in follow-up/ above. This note was generated using Dragon voice recognition software. Please contact me regarding any confusion resulting from errors in recognition. Encounter Times  Including precharting, reviewing chart, prior notes obtaining history: 5 minutes, medical exam :10 minutes, notes on body map, plan, counseling 10minutes My total time spent caring for the patient on the day of the encounter: 25 minutes      Note to patient and family: The [facility] makes medical notes like these available to patients. However, be advised this is a medical document. It is intended as [id number] communication and monitoring of a patient's care needs. It is written in medical language and may contain abbreviations or verbiage that are unfamiliar. It may appear blunt or direct. Medical documents are intended to carry relevant information, facts as evident and the clinical opinion of the practitioner.

## 2024-01-02 ENCOUNTER — ORDER TRANSCRIPTION (OUTPATIENT)
Dept: ADMINISTRATIVE | Facility: HOSPITAL | Age: 79
End: 2024-01-02

## 2024-01-02 DIAGNOSIS — Z13.6 SCREENING FOR CARDIOVASCULAR CONDITION: Primary | ICD-10-CM

## 2024-01-03 ENCOUNTER — NURSE ONLY (OUTPATIENT)
Dept: INTERNAL MEDICINE CLINIC | Facility: HOSPITAL | Age: 79
End: 2024-01-03
Attending: EMERGENCY MEDICINE

## 2024-01-03 DIAGNOSIS — Z00.00 WELLNESS EXAMINATION: Primary | ICD-10-CM

## 2024-01-03 PROCEDURE — 86480 TB TEST CELL IMMUN MEASURE: CPT

## 2024-01-04 LAB
M TB IFN-G CD4+ T-CELLS BLD-ACNC: 0 IU/ML
M TB TUBERC IFN-G BLD QL: NEGATIVE
M TB TUBERC IGNF/MITOGEN IGNF CONTROL: >10 IU/ML
QFT TB1 AG MINUS NIL: 0 IU/ML
QFT TB2 AG MINUS NIL: 0.03 IU/ML

## 2024-01-15 ENCOUNTER — HOSPITAL ENCOUNTER (OUTPATIENT)
Dept: CT IMAGING | Age: 79
Discharge: HOME | End: 2024-01-15
Attending: INTERNAL MEDICINE

## 2024-01-15 DIAGNOSIS — Z13.6 SCREENING FOR CARDIOVASCULAR CONDITION: ICD-10-CM

## 2024-01-22 DIAGNOSIS — N39.41 URGE INCONTINENCE: ICD-10-CM

## 2024-01-24 RX ORDER — SIMVASTATIN 20 MG
10 TABLET ORAL NIGHTLY
Qty: 45 TABLET | Refills: 3 | Status: SHIPPED | OUTPATIENT
Start: 2024-01-24

## 2024-01-24 RX ORDER — TAMSULOSIN HYDROCHLORIDE 0.4 MG/1
0.4 CAPSULE ORAL NIGHTLY
Qty: 90 CAPSULE | Refills: 3 | Status: SHIPPED | OUTPATIENT
Start: 2024-01-24

## 2024-01-24 NOTE — TELEPHONE ENCOUNTER
Refill request is for a maintenance medication and has met the criteria specified in the Ambulatory Medication Refill Standing Order for eligibility, visits, laboratory, alerts and was sent to the requested pharmacy.    Requested Prescriptions     Signed Prescriptions Disp Refills    simvastatin 20 MG Oral Tab 45 tablet 3     Sig: Take 0.5 tablets (10 mg total) by mouth nightly.     Authorizing Provider: D'AMICO, JEFF ANTHONY     Ordering User: JUDY BOONE    tamsulosin 0.4 MG Oral Cap 90 capsule 3     Sig: Take 1 capsule (0.4 mg total) by mouth nightly.     Authorizing Provider: D'AMICO, JEFF ANTHONY     Ordering User: JUDY BOONE

## 2024-03-28 RX ORDER — SIMVASTATIN 20 MG
10 TABLET ORAL NIGHTLY
Qty: 45 TABLET | Refills: 3 | OUTPATIENT
Start: 2024-03-28

## 2024-03-28 NOTE — TELEPHONE ENCOUNTER
Current refill request refused due to refill is either a duplicate request or has active refills at the pharmacy.  Check previous templates.    Requested Prescriptions     Refused Prescriptions Disp Refills    simvastatin 20 MG Oral Tab 45 tablet 3     Sig: Take 0.5 tablets (10 mg total) by mouth nightly.     Refused By: PRASANNA JOHN     Reason for Refusal: Duplicate refill request      Sent 1/24/2024 for 1 year

## 2024-05-14 ENCOUNTER — HOSPITAL ENCOUNTER (OUTPATIENT)
Dept: GENERAL RADIOLOGY | Facility: HOSPITAL | Age: 79
Discharge: HOME | End: 2024-05-14
Attending: ORTHOPAEDIC SURGERY

## 2024-05-14 ENCOUNTER — OFFICE VISIT (OUTPATIENT)
Dept: ORTHOPEDICS CLINIC | Facility: CLINIC | Age: 79
End: 2024-05-14

## 2024-05-14 VITALS — SYSTOLIC BLOOD PRESSURE: 150 MMHG | DIASTOLIC BLOOD PRESSURE: 87 MMHG | HEART RATE: 64 BPM

## 2024-05-14 DIAGNOSIS — M47.812 CERVICAL SPONDYLOSIS: ICD-10-CM

## 2024-05-14 DIAGNOSIS — M54.2 NECK PAIN: ICD-10-CM

## 2024-05-14 DIAGNOSIS — M54.2 NECK PAIN: Primary | ICD-10-CM

## 2024-05-14 DIAGNOSIS — M12.811 RIGHT ROTATOR CUFF TEAR ARTHROPATHY: ICD-10-CM

## 2024-05-14 DIAGNOSIS — M25.511 RIGHT SHOULDER PAIN, UNSPECIFIED CHRONICITY: ICD-10-CM

## 2024-05-14 DIAGNOSIS — M19.011 PRIMARY OSTEOARTHRITIS OF RIGHT SHOULDER: ICD-10-CM

## 2024-05-14 DIAGNOSIS — M75.101 RIGHT ROTATOR CUFF TEAR ARTHROPATHY: ICD-10-CM

## 2024-05-14 PROCEDURE — 72040 X-RAY EXAM NECK SPINE 2-3 VW: CPT | Performed by: ORTHOPAEDIC SURGERY

## 2024-05-14 RX ORDER — TRIAMCINOLONE ACETONIDE 40 MG/ML
40 INJECTION, SUSPENSION INTRA-ARTICULAR; INTRAMUSCULAR ONCE
Status: COMPLETED | OUTPATIENT
Start: 2024-05-14 | End: 2024-05-14

## 2024-05-14 RX ADMIN — TRIAMCINOLONE ACETONIDE 40 MG: 40 INJECTION, SUSPENSION INTRA-ARTICULAR; INTRAMUSCULAR at 11:33:00

## 2024-05-14 NOTE — PROGRESS NOTES
NURSING INTAKE COMMENTS:         HPI: This 78 year old male presents today with neck pain without cervical radiculopathy.  He also has some left posterior neck musculature pain that can radiate had a little.  He does not have a full-blown cervical headache because of some of the headache is on the frontal lobes without connection.    His right shoulder has known rotator cuff arthropathy and he wants a cortisone injection.    His medical history is otherwise unchanged and he remains in good shape physically.    Past Medical History:    Actinic keratosis    left medial cheek    BCC (basal cell carcinoma)    Face - left lateral cheek    BCC (basal cell carcinoma)    Right temple    BCC (basal cell carcinoma)    Right extensor forearm    Colon carcinoma (HCC)    Family history of heart disease    Family h/o ASHD    High cholesterol    History of hydrocelectomy    Inguinal hernia    Left eye injury    2015     Other and unspecified hyperlipidemia    Poison ivy    Skin cancer, basal cell    R chest 1995; L lat canthus 1997     Past Surgical History:   Procedure Laterality Date    Chemotherapy  2000    Colectomy  2000    colon CA    Colonoscopy  03/16/2012, 12/08, 6/04, 4/03, 3/02, 2000    Dr. Wellington    Colonoscopy N/A 11/1/2017    Procedure: COLONOSCOPY;  Surgeon: Sergey Wellington MD;  Location: [facility] ENDOSCOPY    Egd  12/2008, 3/01    Dr. Wellington    Hernia surgery       Current Outpatient Medications   Medication Sig Dispense Refill    simvastatin 20 MG Oral Tab Take 0.5 tablets (10 mg total) by mouth nightly. 45 tablet 3    fluorouracil 5 % External Cream Use twice weekly at night as directed x 6 weeks 40 g 1    Ascorbic Acid (VITAMIN C) 100 MG Oral Tab Take 1 tablet (100 mg total) by mouth daily.      fluticasone propionate 50 MCG/ACT Nasal Suspension 2 sprays by Each Nare route 2 (two) times daily. (Patient taking differently: 2 sprays by Each Nare route as needed.) 2 each 5    Cholecalciferol (VITAMIN D3) 25 MCG Oral Tab  Take 1 tablet (1,000 Units total) by mouth daily.      Multiple Vitamins-Minerals (PRESERVISION AREDS) Oral Tab Take 2 tablets by mouth daily.      Glucosamine HCl-MSM (GLUCOSAMINE-MSM) 1500-500 MG/30ML Oral Liquid Take 2 tablets by mouth daily.        loratadine 10 MG Oral Tab Take 1 tablet (10 mg total) by mouth daily as needed.      Multiple Vitamin (MULTI-VITAMINS) Oral Tab Take 1 tablet by mouth daily.      Coenzyme Q10 (COQ-10) 100 MG Oral Cap Take 1 capsule by mouth daily.      tamsulosin 0.4 MG Oral Cap Take 1 capsule (0.4 mg total) by mouth nightly. 90 capsule 3     Allergies   Allergen Reactions    Pollen      Seasonal allergies    Tramadol UNKNOWN     Family History   Problem Relation Age of Onset    Stroke Father 80    Breast Cancer Mother 78        transitioned to ovarian      No family Hx of DVT/PE    Social History     Occupational History    Not on file   Tobacco Use    Smoking status: Former     Current packs/day: 0.00     Average packs/day: 1 pack/day for 15.0 years (15.0 ttl pk-yrs)     Types: Cigarettes     Start date: 6/15/1970     Quit date: 6/15/1985     Years since quittin.9     Passive exposure: Past    Smokeless tobacco: Never   Vaping Use    Vaping status: Never Used   Substance and Sexual Activity    Alcohol use: Yes     Alcohol/week: 7.0 standard drinks of alcohol     Types: 7 Cans of beer per week     Comment: Occ    Drug use: No    Sexual activity: Not on file        Review of Systems:  GENERAL: feels generally well, no significant weight loss or weight gain  SKIN: no ulcerated or worrisome skin lesions  EYES:denies blurred vision or double vision  HEENT: denies new nasal congestion, sinus pain or ST  LUNGS: denies shortness of breath  CARDIOVASCULAR: denies chest pain  GI: no hematemesis, no worsening heartburn, no diarrhea  : no dysuria, no blood in urine, no difficulty urinating, no incontinence  MUSCULOSKELETAL: no other musculoskeletal complaints other than in HPI  NEURO:  no numbness or tingling, no weakness or balance disorder  PSYCHE: no depression or anxiety  HEMATOLOGIC: no hx of blood dyscrasia, no Hx DVT/PE  ENDOCRINE: no thyroid or diabetes issues  ALL/ASTHMA: no new hx of severe allergy or asthma    Physical Examination:    /87 (BP Location: Right arm, Patient Position: Sitting, Cuff Size: adult)   Pulse 64   Constitutional: appears well hydrated, alert and responsive, no acute distress noted  Extremities: Cervical motion full in all directions with negative Spurling test.  Pain at the extremes.  No exacerbation of the symptomatic with extension today.  Musculoskeletal: Right shoulder motion symmetrically full to the left.  Weakness to the supraspinatus and infraspinatus but the subscapularis remains strong.  No crepitus.  No acromioclavicular tenderness.  No deformity.  Neurological: Normal motor and sensory right upper extremity except for the shoulder as above.  Normal motor and sensory left upper extremity.    Imaging: X-rays of the cervical spine today in the office show degenerative disc disease at C3-4, C5-6, C6-7.  No acute findings.      No results found.     Lab Results   Component Value Date    WBC 6.7 11/22/2023    HGB 15.5 11/22/2023    .0 11/22/2023      Lab Results   Component Value Date    GLU 95 11/22/2023    BUN 18 11/22/2023    CREATSERUM 1.13 11/22/2023    GFRNAA 70 11/10/2021    GFRAA 81 11/10/2021        Assessment and Plan:  Diagnoses and all orders for this visit:    Neck pain  -     Cancel: XR CERVICAL SPINE (4VIEWS) (CPT=72050); Future  -     PHYSICAL THERAPY - INTERNAL    Right shoulder pain, unspecified chronicity  -     Arthrocentesis aspiration and injection major Right joint bursa w/o US  -     triamcinolone acetonide (Kenalog-40) 40 MG/ML injection 40 mg    Right rotator cuff tear arthropathy  -     Arthrocentesis aspiration and injection major Right joint bursa w/o US  -     triamcinolone acetonide (Kenalog-40) 40 MG/ML injection  40 mg    Primary osteoarthritis of right shoulder  -     Arthrocentesis aspiration and injection major Right joint bursa w/o US  -     triamcinolone acetonide (Kenalog-40) 40 MG/ML injection 40 mg    Cervical spondylosis  -     PHYSICAL THERAPY - INTERNAL        Assessment: Above diagnoses and some cervical headache.    Plan: For the cervical spine recommended a course of physical therapy.  He will do the home exercise program.  If he is not improved in a month or so, he can call the office to have MRI cervical spine ordered and a pain center referral given.  He may benefit from injections at that point if he still has pain.    For the right shoulder, he desired cortisone injection.  Aspiration was negative and he tolerated the injection bupivacaine 0.5% 5 cc and Kenalog 1 cc without issue.    For now I will see him as needed.    Follow Up: No follow-ups on file.    Troy López MD

## 2024-05-14 NOTE — PROGRESS NOTES
Per verbal order from Dr. López, draw up 5ml of 0.5% Marcaine and 1ml of Kenalog 40 for cortisone injection to right shoulder Silvia Basilio  Patient provided education handout for cortisone injection.
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
## 2024-05-16 ENCOUNTER — OFFICE VISIT (OUTPATIENT)
Dept: PHYSICAL THERAPY | Facility: HOSPITAL | Age: 79
End: 2024-05-16
Attending: ORTHOPAEDIC SURGERY

## 2024-05-16 DIAGNOSIS — M47.812 CERVICAL SPONDYLOSIS: ICD-10-CM

## 2024-05-16 DIAGNOSIS — M54.2 NECK PAIN: Primary | ICD-10-CM

## 2024-05-16 PROCEDURE — 97140 MANUAL THERAPY 1/> REGIONS: CPT

## 2024-05-16 PROCEDURE — 97162 PT EVAL MOD COMPLEX 30 MIN: CPT

## 2024-05-17 NOTE — PROGRESS NOTES
Diagnosis:   Neck pain (M54.2)  Cervical spondylosis (M47.812)        Referring Provider: Troy López  Date of Evaluation:    5/16/2024    Precautions:  Cancer Next MD visit:   none scheduled  Date of Surgery: n/a   Insurance Primary/Secondary: MEDICARE / [employer] [place] INDEMNITY     # Auth Visits: 10            Subjective: The pt reports things feel about the same. States he has been sleeping with 2 pillows and that seems to work better. Still having headaches, seems to radiate from the base of the head. Mostly pain on the L side of the neck with movement.    Pain: 2/10      Objective:   Observation/Posture: fwd head, B scap in IR  Neuro Screen: intact sensation to light touch BUE     Cervical AROM: (* denotes performed with pain)  Flexion: 78  Extension: 8*  Sidebending: R 24*; L 33  Rotation: R 58*; L 42*     Shoulder AROM  Flexion, abd B WFLs     Accessory motion: general hypomobility throughout upper thoracic and cervical spine, symptoms produced with R and L UPAs to the cervical spine  Palpation: increased tone L vs R CPS and suboccipitals     Strength: (* denotes performed with pain)  UE/Scapular   Shoulder Flex: R 4/5, L 5/5  Shoulder ABD (C5): R 4+/5, L 5/5  Biceps (C6): R 5/5, L 5/5  Wrist ext (C6): R 5/5, L 5/5  Triceps (C7): R 5/5, L 5/5  Wrist Flex (C7): R 5/5, L 5/5  Thumb Ext (C8): R 5/5, L 5/5  Interossei (T1): R 5/5, L 5/5      Strength: R 53.2 lbs; L 55.6 lbs, pt is R dominant         Special tests:   Modified Vertebral Artery: R -, L -  Alar Ligament Testing: R -, L -  Sharp Libertad: -       Assessment: Cervical mobilization was effective in improving cervical ext and R rot ROM, and SOR produced headache comparable sign. Supine retractions was additionally positive for comparable sign and this lessened with reps. Self OP with the towel with stretching further improved AROM and HEP was updated.      Goals:   Pt will improve cervical AROM extension by 15 degrees to improve tolerance for putting  dishes into overhead cabinets   Pt will improve cervical AROM rotation to >60 degrees to improve tolerance for turning head to check blind spot while driving  Pt will have improved thoracic PA mobility to WNL to improve cervical ROM as well as promote upright posturing and decreased pain with bed mobility   Pt will report decreased frequency of headaches to <2x/week  Pt will demonstrate improved cervical intrinsic strength to 4/5 to allow improved cervical stabilization with overhead reaching  Pt will be independent and compliant with comprehensive HEP to maintain progress achieved in PT     Plan:  Patient will be seen for 2 x/week or a total of 10 visits over a 90 day period. Treatment will include: Manual Therapy, Neuromuscular Re-education, Therapeutic Activities, Therapeutic Exercise, and Home Exercise Program instruction  Date: 5/20/2024  TX#: 2/10 Date:                 TX#: 3/ Date:                 TX#: 4/ Date:                 TX#: 5/ Date:   Tx#: 6/   MT 30 min  Gr III- R and L cervical UPAs  Intermittent manual cervical traction  R and L sideglides to C1-2  SOR        TE 12 min  Supine cervical retractions x30  Seated cervical rotation with towel for OP x30  Seated cervical ext with towel for OP x30                     HEP: cervical rot R and L with towel, cervical ext with towel, supine cervical retractions    Charges: MTx2 TEx1       Total Timed Treatment: 42 min  Total Treatment Time: 42 min

## 2024-05-20 ENCOUNTER — OFFICE VISIT (OUTPATIENT)
Dept: PHYSICAL THERAPY | Facility: HOSPITAL | Age: 79
End: 2024-05-20
Attending: ORTHOPAEDIC SURGERY

## 2024-05-20 PROCEDURE — 97110 THERAPEUTIC EXERCISES: CPT

## 2024-05-20 PROCEDURE — 97140 MANUAL THERAPY 1/> REGIONS: CPT

## 2024-05-22 ENCOUNTER — OFFICE VISIT (OUTPATIENT)
Dept: PHYSICAL THERAPY | Facility: HOSPITAL | Age: 79
End: 2024-05-22
Attending: ORTHOPAEDIC SURGERY

## 2024-05-22 PROCEDURE — 97110 THERAPEUTIC EXERCISES: CPT

## 2024-05-22 PROCEDURE — 97140 MANUAL THERAPY 1/> REGIONS: CPT

## 2024-05-22 NOTE — PROGRESS NOTES
Diagnosis:   Neck pain (M54.2)  Cervical spondylosis (M47.812)        Referring Provider: Troy López  Date of Evaluation:    5/16/2024    Precautions:  Cancer Next MD visit:   none scheduled  Date of Surgery: n/a   Insurance Primary/Secondary: MEDICARE / [place] INDEMNITY     # Auth Visits: 10            Subjective: The pt states he was doing his exercises last night and started to get a headache. States no pain right now though. States he was a little sore on the L side of the neck after the last time. Felt pretty good the rest of that day and the next.    Pain: 0/10      Objective:   Observation/Posture: fwd head, B scap in IR  Neuro Screen: intact sensation to light touch BUE     Cervical AROM: (* denotes performed with pain)  Flexion: 78  Extension: 8*  Sidebending: R 24*; L 33  Rotation: R 58*; L 42*     Shoulder AROM  Flexion, abd B WFLs     Accessory motion: general hypomobility throughout upper thoracic and cervical spine, symptoms produced with R and L UPAs to the cervical spine  Palpation: increased tone L vs R CPS and suboccipitals     Strength: (* denotes performed with pain)  UE/Scapular   Shoulder Flex: R 4/5, L 5/5  Shoulder ABD (C5): R 4+/5, L 5/5  Biceps (C6): R 5/5, L 5/5  Wrist ext (C6): R 5/5, L 5/5  Triceps (C7): R 5/5, L 5/5  Wrist Flex (C7): R 5/5, L 5/5  Thumb Ext (C8): R 5/5, L 5/5  Interossei (T1): R 5/5, L 5/5      Strength: R 53.2 lbs; L 55.6 lbs, pt is R dominant         Special tests:   Modified Vertebral Artery: R -, L -  Alar Ligament Testing: R -, L -  Sharp Libertad: -       Assessment: The pt had good carryover of increased cervical ROM from the previous session. Manual therapy was again effective in producing further intra session gains in ROM and decreased pain with movement. There were some initial cavitations with L sided lower grade mobilization, these were not painful.      Goals:   Pt will improve cervical AROM extension by 15 degrees to improve tolerance for putting  dishes into overhead cabinets   Pt will improve cervical AROM rotation to >60 degrees to improve tolerance for turning head to check blind spot while driving  Pt will have improved thoracic PA mobility to WNL to improve cervical ROM as well as promote upright posturing and decreased pain with bed mobility   Pt will report decreased frequency of headaches to <2x/week  Pt will demonstrate improved cervical intrinsic strength to 4/5 to allow improved cervical stabilization with overhead reaching  Pt will be independent and compliant with comprehensive HEP to maintain progress achieved in PT     Plan:  Patient will be seen for 2 x/week or a total of 10 visits over a 90 day period. Treatment will include: Manual Therapy, Neuromuscular Re-education, Therapeutic Activities, Therapeutic Exercise, and Home Exercise Program instruction  Date: 5/20/2024  TX#: 2/10 Date: 5/22/2024             TX#: 3/10 Date:                 TX#: 4/ Date:                 TX#: 5/ Date:   Tx#: 6/   MT 30 min  Gr III- R and L cervical UPAs  Intermittent manual cervical traction  R and L sideglides to C1-2  SOR  MT 35 min  Gr III- R and L cervical UPAs  Intermittent manual cervical traction  R and L sideglides to C1-2  SOR       TE 12 min  Supine cervical retractions x30  Seated cervical rotation with towel for OP x30  Seated cervical ext with towel for OP x30 TE 8 min  Seated cervical rotation with towel for OP x10 R and L  Seated cervical ext with towel for OP x10                    HEP: cervical rot R and L with towel, cervical ext with towel, supine cervical retractions    Charges: MTx2 TEx1       Total Timed Treatment: 43 min  Total Treatment Time: 43 min

## 2024-05-29 ENCOUNTER — OFFICE VISIT (OUTPATIENT)
Dept: PHYSICAL THERAPY | Facility: HOSPITAL | Age: 79
End: 2024-05-29
Attending: ORTHOPAEDIC SURGERY

## 2024-05-29 PROCEDURE — 97140 MANUAL THERAPY 1/> REGIONS: CPT

## 2024-05-29 PROCEDURE — 97110 THERAPEUTIC EXERCISES: CPT

## 2024-05-29 NOTE — PROGRESS NOTES
Diagnosis:   Neck pain (M54.2)  Cervical spondylosis (M47.812)        Referring Provider: Troy López  Date of Evaluation:    5/16/2024    Precautions:  Cancer Next MD visit:   none scheduled  Date of Surgery: n/a   Insurance Primary/Secondary: MEDICARE / [employer] INDEMNITY     # Auth Visits: 10            Subjective: The pt states he has the most trouble looking up and the L side of the neck feels tight. States looking to the side is much better now. But also notices pain with biking and trying to look up, and also if he bikes over a bump he feels it in his neck.  Pain: 0/10 at rest, 2/10 with extension      Objective:   Observation/Posture: fwd head, B scap in IR  Neuro Screen: intact sensation to light touch BUE     Cervical AROM: (* denotes performed with pain)  Flexion: 78  Extension: 8*  Sidebending: R 24*; L 33  Rotation: R 58*; L 42*     Shoulder AROM  Flexion, abd B WFLs     Accessory motion: general hypomobility throughout upper thoracic and cervical spine, symptoms produced with R and L UPAs to the cervical spine  Palpation: increased tone L vs R CPS and suboccipitals     Strength: (* denotes performed with pain)  UE/Scapular   Shoulder Flex: R 4/5, L 5/5  Shoulder ABD (C5): R 4+/5, L 5/5  Biceps (C6): R 5/5, L 5/5  Wrist ext (C6): R 5/5, L 5/5  Triceps (C7): R 5/5, L 5/5  Wrist Flex (C7): R 5/5, L 5/5  Thumb Ext (C8): R 5/5, L 5/5  Interossei (T1): R 5/5, L 5/5      Strength: R 53.2 lbs; L 55.6 lbs, pt is R dominant         Special tests:   Modified Vertebral Artery: R -, L -  Alar Ligament Testing: R -, L -  Sharp Libertad: -       Assessment: The pt had significant gains in cervical ext ROM and decreased pain with movement after manual therapy. Carryover of gains is additionally improving. He was able to advance with intrinsic cervical strengthening with good motor control and without pain.      Goals:   Pt will improve cervical AROM extension by 15 degrees to improve tolerance for putting dishes  into overhead cabinets   Pt will improve cervical AROM rotation to >60 degrees to improve tolerance for turning head to check blind spot while driving  Pt will have improved thoracic PA mobility to WNL to improve cervical ROM as well as promote upright posturing and decreased pain with bed mobility   Pt will report decreased frequency of headaches to <2x/week  Pt will demonstrate improved cervical intrinsic strength to 4/5 to allow improved cervical stabilization with overhead reaching  Pt will be independent and compliant with comprehensive HEP to maintain progress achieved in PT     Plan:  Patient will be seen for 2 x/week or a total of 10 visits over a 90 day period. Treatment will include: Manual Therapy, Neuromuscular Re-education, Therapeutic Activities, Therapeutic Exercise, and Home Exercise Program instruction  Date: 5/20/2024  TX#: 2/10 Date: 5/22/2024             TX#: 3/10 Date: 5/29/2024              TX#: 4/10 Date:                 TX#: 5/ Date:   Tx#: 6/   MT 30 min  Gr III- R and L cervical UPAs  Intermittent manual cervical traction  R and L sideglides to C1-2  SOR  MT 35 min  Gr III- R and L cervical UPAs  Intermittent manual cervical traction  R and L sideglides to C1-2  SOR  MT 35 min  Gr III- R and L cervical UPAs  Gr III C1/2 and upper thoracic   STM L CPS  Intermittent manual cervical traction  R and L sideglides to C1-2  SOR      TE 12 min  Supine cervical retractions x30  Seated cervical rotation with towel for OP x30  Seated cervical ext with towel for OP x30 TE 8 min  Seated cervical rotation with towel for OP x10 R and L  Seated cervical ext with towel for OP x10 TE 8 min  Cervical rotation with ball at wall x20 R and L  Seated thoracic and cervical extension in chair x10                   HEP: cervical rot R and L with towel, cervical ext with towel, supine cervical retractions, seated thoracic and cervical ext in chair    Charges: MTx2 TEx1       Total Timed Treatment: 43 min  Total  Treatment Time: 43 min

## 2024-06-17 ENCOUNTER — OFFICE VISIT (OUTPATIENT)
Dept: PHYSICAL THERAPY | Facility: HOSPITAL | Age: 79
End: 2024-06-17
Attending: ORTHOPAEDIC SURGERY
Payer: MEDICARE

## 2024-06-17 PROCEDURE — 97140 MANUAL THERAPY 1/> REGIONS: CPT

## 2024-06-17 PROCEDURE — 97110 THERAPEUTIC EXERCISES: CPT

## 2024-06-17 NOTE — PROGRESS NOTES
Diagnosis:   Neck pain (M54.2)  Cervical spondylosis (M47.812)        Referring Provider: Troy López  Date of Evaluation:    5/16/2024    Precautions:  Cancer Next MD visit:   none scheduled  Date of Surgery: n/a   Insurance Primary/Secondary: MEDICARE / [employer] INDEMNITY     # Auth Visits: 10            Subjective: The pt reports he still has the same pain in the neck. States it still feels tight and he still has trouble looking up. Also had pain if he bikes over something bumpy he feels the jolt in his neck.  Pain: 0/10 at rest, 2/10 with extension      Objective:   Observation/Posture: fwd head, B scap in IR  Neuro Screen: intact sensation to light touch BUE     Cervical AROM: (* denotes performed with pain)  Flexion: 78  Extension: 8*  Sidebending: R 24*; L 33  Rotation: R 58*; L 42*     Shoulder AROM  Flexion, abd B WFLs     Accessory motion: general hypomobility throughout upper thoracic and cervical spine, symptoms produced with R and L UPAs to the cervical spine  Palpation: increased tone L vs R CPS and suboccipitals     Strength: (* denotes performed with pain)  UE/Scapular   Shoulder Flex: R 4/5, L 5/5  Shoulder ABD (C5): R 4+/5, L 5/5  Biceps (C6): R 5/5, L 5/5  Wrist ext (C6): R 5/5, L 5/5  Triceps (C7): R 5/5, L 5/5  Wrist Flex (C7): R 5/5, L 5/5  Thumb Ext (C8): R 5/5, L 5/5  Interossei (T1): R 5/5, L 5/5      Strength: R 53.2 lbs; L 55.6 lbs, pt is R dominant         Special tests:   Modified Vertebral Artery: R -, L -  Alar Ligament Testing: R -, L -  Sharp Libertad: -       Assessment: The pt continues to have improved ROM and decreased pain after manual techniques however carryover between sessions is minimal. Discussed continued self stretching at home and adding stretching in the morning and the pt was in agreement with the plan.    Goals:   Pt will improve cervical AROM extension by 15 degrees to improve tolerance for putting dishes into overhead cabinets   Pt will improve cervical AROM  rotation to >60 degrees to improve tolerance for turning head to check blind spot while driving  Pt will have improved thoracic PA mobility to WNL to improve cervical ROM as well as promote upright posturing and decreased pain with bed mobility   Pt will report decreased frequency of headaches to <2x/week  Pt will demonstrate improved cervical intrinsic strength to 4/5 to allow improved cervical stabilization with overhead reaching  Pt will be independent and compliant with comprehensive HEP to maintain progress achieved in PT     Plan:  Patient will be seen for 2 x/week or a total of 10 visits over a 90 day period. Treatment will include: Manual Therapy, Neuromuscular Re-education, Therapeutic Activities, Therapeutic Exercise, and Home Exercise Program instruction  Date: 5/20/2024  TX#: 2/10 Date: 5/22/2024             TX#: 3/10 Date: 5/29/2024              TX#: 4/10 Date: 6/17/2024              TX#: 5/10 Date:   Tx#: 6/   MT 30 min  Gr III- R and L cervical UPAs  Intermittent manual cervical traction  R and L sideglides to C1-2  SOR  MT 35 min  Gr III- R and L cervical UPAs  Intermittent manual cervical traction  R and L sideglides to C1-2  SOR  MT 35 min  Gr III- R and L cervical UPAs  Gr III C1/2 and upper thoracic   STM L CPS  Intermittent manual cervical traction  R and L sideglides to C1-2  SOR  MT 32 min  Gr III+ L cervical UPAs  Gr III+ cervical and upper thoracic   Intermittent manual cervical traction  L sideglides to C1-2  SOR     TE 12 min  Supine cervical retractions x30  Seated cervical rotation with towel for OP x30  Seated cervical ext with towel for OP x30 TE 8 min  Seated cervical rotation with towel for OP x10 R and L  Seated cervical ext with towel for OP x10 TE 8 min  Cervical rotation with ball at wall x20 R and L  Seated thoracic and cervical extension in chair x10 TE 8 min  Cervical rotation with ball at wall x20 R and L, extension x20                    HEP: cervical rot R and L  with towel, cervical ext with towel, supine cervical retractions, seated thoracic and cervical ext in chair    Charges: MTx2 TEx1       Total Timed Treatment: 40 min  Total Treatment Time: 40 min

## 2024-06-19 ENCOUNTER — APPOINTMENT (OUTPATIENT)
Dept: PHYSICAL THERAPY | Facility: HOSPITAL | Age: 79
End: 2024-06-19
Payer: MEDICARE

## 2024-06-21 ENCOUNTER — OFFICE VISIT (OUTPATIENT)
Dept: PHYSICAL THERAPY | Facility: HOSPITAL | Age: 79
End: 2024-06-21
Attending: ORTHOPAEDIC SURGERY
Payer: MEDICARE

## 2024-06-21 PROCEDURE — 97110 THERAPEUTIC EXERCISES: CPT

## 2024-06-21 PROCEDURE — 97140 MANUAL THERAPY 1/> REGIONS: CPT

## 2024-06-21 RX ORDER — SIMVASTATIN 20 MG
10 TABLET ORAL NIGHTLY
Qty: 45 TABLET | Refills: 3 | Status: CANCELLED | OUTPATIENT
Start: 2024-06-21

## 2024-06-21 NOTE — PROGRESS NOTES
Diagnosis:   Neck pain (M54.2)  Cervical spondylosis (M47.812)        Referring Provider: Troy López  Date of Evaluation:    5/16/2024    Precautions:  Cancer Next MD visit:   none scheduled  Date of Surgery: n/a   Insurance Primary/Secondary: MEDICARE / [employer] [place] INDEMNITY     # Auth Visits: 10            Subjective: The pt states looking side to side has improved. Tried heat at night, helps a little but the effects are not long lasting. States looking up and going over bump on the bike are still painful. States the effects of therapy seem to last for a few hours.  Pain: 0/10 at rest, 2/10 with extension      Objective:   Observation/Posture: fwd head, B scap in IR  Neuro Screen: intact sensation to light touch BUE     Cervical AROM: (* denotes performed with pain)  Flexion: 78  Extension: 8*  Sidebending: R 24*; L 33  Rotation: R 58*; L 42*     Shoulder AROM  Flexion, abd B WFLs     Accessory motion: general hypomobility throughout upper thoracic and cervical spine, symptoms produced with R and L UPAs to the cervical spine  Palpation: increased tone L vs R CPS and suboccipitals     Strength: (* denotes performed with pain)  UE/Scapular   Shoulder Flex: R 4/5, L 5/5  Shoulder ABD (C5): R 4+/5, L 5/5  Biceps (C6): R 5/5, L 5/5  Wrist ext (C6): R 5/5, L 5/5  Triceps (C7): R 5/5, L 5/5  Wrist Flex (C7): R 5/5, L 5/5  Thumb Ext (C8): R 5/5, L 5/5  Interossei (T1): R 5/5, L 5/5      Strength: R 53.2 lbs; L 55.6 lbs, pt is R dominant         Special tests:   Modified Vertebral Artery: R -, L -  Alar Ligament Testing: R -, L -  Sharp Libertad: -       Assessment: The pt continues to have improved ROM and decreased pain after manual techniques however carryover between sessions is minimal. Discussed performing cervical ext AROM to end range every 2 hours as well as discussing possible use of anti inflammatories with MD. Will continue to assess carryover between sessions.    Goals:   Pt will improve cervical AROM  extension by 15 degrees to improve tolerance for putting dishes into overhead cabinets   Pt will improve cervical AROM rotation to >60 degrees to improve tolerance for turning head to check blind spot while driving  Pt will have improved thoracic PA mobility to WNL to improve cervical ROM as well as promote upright posturing and decreased pain with bed mobility   Pt will report decreased frequency of headaches to <2x/week  Pt will demonstrate improved cervical intrinsic strength to 4/5 to allow improved cervical stabilization with overhead reaching  Pt will be independent and compliant with comprehensive HEP to maintain progress achieved in PT     Plan:  Patient will be seen for 2 x/week or a total of 10 visits over a 90 day period. Treatment will include: Manual Therapy, Neuromuscular Re-education, Therapeutic Activities, Therapeutic Exercise, and Home Exercise Program instruction  Date: 5/20/2024  TX#: 2/10 Date: 5/22/2024             TX#: 3/10 Date: 5/29/2024              TX#: 4/10 Date: 6/17/2024              TX#: 5/10 Date: 6/21/2024  Tx#: 6/10   MT 30 min  Gr III- R and L cervical UPAs  Intermittent manual cervical traction  R and L sideglides to C1-2  SOR  MT 35 min  Gr III- R and L cervical UPAs  Intermittent manual cervical traction  R and L sideglides to C1-2  SOR  MT 35 min  Gr III- R and L cervical UPAs  Gr III C1/2 and upper thoracic   STM L CPS  Intermittent manual cervical traction  R and L sideglides to C1-2  SOR  MT 32 min  Gr III+ L cervical UPAs  Gr III+ cervical and upper thoracic   Intermittent manual cervical traction  L sideglides to C1-2  SOR  MT 30 min  Gr III+ L cervical UPAs  Gr III+ cervical and upper thoracic    TE 12 min  Supine cervical retractions x30  Seated cervical rotation with towel for OP x30  Seated cervical ext with towel for OP x30 TE 8 min  Seated cervical rotation with towel for OP x10 R and L  Seated cervical ext with towel for OP x10 TE 8 min  Cervical  rotation with ball at wall x20 R and L  Seated thoracic and cervical extension in chair x10 TE 8 min  Cervical rotation with ball at wall x20 R and L, extension x20   TE 12 min  Cervical rotation with ball at wall x20 R and L, extension x20  Discussion of outcomes and HEP                 HEP: cervical rot R and L with towel, cervical ext with towel, supine cervical retractions, seated thoracic and cervical ext in chair, cervical ext x10 every 2 hours    Charges: MTx2 TEx1       Total Timed Treatment: 42 min  Total Treatment Time: 42 min

## 2024-06-21 NOTE — TELEPHONE ENCOUNTER
1 year sent in jan     Current refill request refused due to refill is either a duplicate request or has active refills at the pharmacy.  Check previous templates.    Requested Prescriptions     Pending Prescriptions Disp Refills    simvastatin 20 MG Oral Tab 45 tablet 3     Sig: Take 0.5 tablets (10 mg total) by mouth nightly.

## 2024-06-25 ENCOUNTER — OFFICE VISIT (OUTPATIENT)
Dept: PHYSICAL THERAPY | Facility: HOSPITAL | Age: 79
End: 2024-06-25
Attending: ORTHOPAEDIC SURGERY
Payer: MEDICARE

## 2024-06-25 PROCEDURE — 97140 MANUAL THERAPY 1/> REGIONS: CPT

## 2024-06-25 PROCEDURE — 97110 THERAPEUTIC EXERCISES: CPT

## 2024-06-25 NOTE — PROGRESS NOTES
Diagnosis:   Neck pain (M54.2)  Cervical spondylosis (M47.812)        Referring Provider: Troy López  Date of Evaluation:    5/16/2024    Precautions:  Cancer Next MD visit:   none scheduled  Date of Surgery: n/a   Insurance Primary/Secondary: MEDICARE / [employer] [place] INDEMNITY     # Auth Visits: 10            Subjective: The pt states he feels he has a little more mobility but has also noticed more crunching. States at times the crunching is painful. Some pain at the end of neck extension too but minor.  Pain: 0/10 at rest, 2/10 with extension      Objective:   Observation/Posture: fwd head, B scap in IR  Neuro Screen: intact sensation to light touch BUE     Cervical AROM: (* denotes performed with pain)  Flexion: 78  Extension: 8*  Sidebending: R 24*; L 33  Rotation: R 58*; L 42*     Shoulder AROM  Flexion, abd B WFLs     Accessory motion: general hypomobility throughout upper thoracic and cervical spine, symptoms produced with R and L UPAs to the cervical spine  Palpation: increased tone L vs R CPS and suboccipitals     Strength: (* denotes performed with pain)  UE/Scapular   Shoulder Flex: R 4/5, L 5/5  Shoulder ABD (C5): R 4+/5, L 5/5  Biceps (C6): R 5/5, L 5/5  Wrist ext (C6): R 5/5, L 5/5  Triceps (C7): R 5/5, L 5/5  Wrist Flex (C7): R 5/5, L 5/5  Thumb Ext (C8): R 5/5, L 5/5  Interossei (T1): R 5/5, L 5/5      Strength: R 53.2 lbs; L 55.6 lbs, pt is R dominant         Special tests:   Modified Vertebral Artery: R -, L -  Alar Ligament Testing: R -, L -  Sharp Libertad: -       Assessment: The pt had improved carryover of increased extension ROM compared to the previous session, manual therapy was still effective in further increasing range. Instructed him to continues with AROM into extension every 2 hours to maintain ranges achieved.     Goals:   Pt will improve cervical AROM extension by 15 degrees to improve tolerance for putting dishes into overhead cabinets   Pt will improve cervical AROM rotation to  >60 degrees to improve tolerance for turning head to check blind spot while driving  Pt will have improved thoracic PA mobility to WNL to improve cervical ROM as well as promote upright posturing and decreased pain with bed mobility   Pt will report decreased frequency of headaches to <2x/week  Pt will demonstrate improved cervical intrinsic strength to 4/5 to allow improved cervical stabilization with overhead reaching  Pt will be independent and compliant with comprehensive HEP to maintain progress achieved in PT     Plan:  Patient will be seen for 2 x/week or a total of 10 visits over a 90 day period. Treatment will include: Manual Therapy, Neuromuscular Re-education, Therapeutic Activities, Therapeutic Exercise, and Home Exercise Program instruction  Date: 5/20/2024  TX#: 2/10 Date: 5/22/2024             TX#: 3/10 Date: 5/29/2024              TX#: 4/10 Date: 6/17/2024              TX#: 5/10 Date: 6/21/2024  Tx#: 6/10 Date: 6/25/2024  Tx#: 7/10   MT 30 min  Gr III- R and L cervical UPAs  Intermittent manual cervical traction  R and L sideglides to C1-2  SOR  MT 35 min  Gr III- R and L cervical UPAs  Intermittent manual cervical traction  R and L sideglides to C1-2  SOR  MT 35 min  Gr III- R and L cervical UPAs  Gr III C1/2 and upper thoracic   STM L CPS  Intermittent manual cervical traction  R and L sideglides to C1-2  SOR  MT 32 min  Gr III+ L cervical UPAs  Gr III+ cervical and upper thoracic   Intermittent manual cervical traction  L sideglides to C1-2  SOR  MT 30 min  Gr III+ L cervical UPAs  Gr III+ cervical and upper thoracic  MT 32 min  Gr III+ L cervical UPAs  Gr III+ cervical and upper thoracic   Cervical PROM ext, R and L rot in ext   TE 12 min  Supine cervical retractions x30  Seated cervical rotation with towel for OP x30  Seated cervical ext with towel for OP x30 TE 8 min  Seated cervical rotation with towel for OP x10 R and L  Seated cervical ext with towel for OP x10 TE 8  min  Cervical rotation with ball at wall x20 R and L  Seated thoracic and cervical extension in chair x10 TE 8 min  Cervical rotation with ball at wall x20 R and L, extension x20   TE 12 min  Cervical rotation with ball at wall x20 R and L, extension x20  Discussion of outcomes and HEP TE 8 min  Cervical rotation with ball at wall x20 R and L, extension x20                   HEP: cervical rot R and L with towel, cervical ext with towel, supine cervical retractions, seated thoracic and cervical ext in chair, cervical ext x10 every 2 hours    Charges: MTx2 TEx1       Total Timed Treatment: 40 min  Total Treatment Time: 40 min

## 2024-06-26 ENCOUNTER — APPOINTMENT (OUTPATIENT)
Dept: PHYSICAL THERAPY | Facility: HOSPITAL | Age: 79
End: 2024-06-26
Payer: MEDICARE

## 2024-06-28 ENCOUNTER — OFFICE VISIT (OUTPATIENT)
Dept: PHYSICAL THERAPY | Facility: HOSPITAL | Age: 79
End: 2024-06-28
Attending: ORTHOPAEDIC SURGERY
Payer: MEDICARE

## 2024-06-28 PROCEDURE — 97140 MANUAL THERAPY 1/> REGIONS: CPT

## 2024-06-28 NOTE — PROGRESS NOTES
Diagnosis:   Neck pain (M54.2)  Cervical spondylosis (M47.812)        Referring Provider: Troy López  Date of Evaluation:    5/16/2024    Precautions:  Cancer Next MD visit:   none scheduled  Date of Surgery: n/a   Insurance Primary/Secondary: MEDICARE / [employer] [place] INDEMNITY     # Auth Visits: 10            Subjective: The pt states he took an aleve this morning. States he got a ball and has been doing the HEP. States even though the range has improved he still has pain with looking up. States the pain seems unchanged.   Pain: 0/10 at rest, 2/10 with extension      Objective:   Observation/Posture: fwd head, B scap in IR  Neuro Screen: intact sensation to light touch BUE     Cervical AROM: (* denotes performed with pain)  Flexion: 78  Extension: 8*  Sidebending: R 24*; L 33  Rotation: R 58*; L 42*     Shoulder AROM  Flexion, abd B WFLs     Accessory motion: general hypomobility throughout upper thoracic and cervical spine, symptoms produced with R and L UPAs to the cervical spine  Palpation: increased tone L vs R CPS and suboccipitals     Strength: (* denotes performed with pain)  UE/Scapular   Shoulder Flex: R 4/5, L 5/5  Shoulder ABD (C5): R 4+/5, L 5/5  Biceps (C6): R 5/5, L 5/5  Wrist ext (C6): R 5/5, L 5/5  Triceps (C7): R 5/5, L 5/5  Wrist Flex (C7): R 5/5, L 5/5  Thumb Ext (C8): R 5/5, L 5/5  Interossei (T1): R 5/5, L 5/5      Strength: R 53.2 lbs; L 55.6 lbs, pt is R dominant         Special tests:   Modified Vertebral Artery: R -, L -  Alar Ligament Testing: R -, L -  Sharp Libertad: -       Assessment: STM to L suboccipitals produced headache comparable sign and continue to be symptomatic. He consistently has improved extension ROM with decreased pain at the end of therapy sessions, however carryover between sessions is still minimal.    Goals:   Pt will improve cervical AROM extension by 15 degrees to improve tolerance for putting dishes into overhead cabinets   Pt will improve cervical AROM rotation  to >60 degrees to improve tolerance for turning head to check blind spot while driving  Pt will have improved thoracic PA mobility to WNL to improve cervical ROM as well as promote upright posturing and decreased pain with bed mobility   Pt will report decreased frequency of headaches to <2x/week  Pt will demonstrate improved cervical intrinsic strength to 4/5 to allow improved cervical stabilization with overhead reaching  Pt will be independent and compliant with comprehensive HEP to maintain progress achieved in PT     Plan:  Patient will be seen for 2 x/week or a total of 10 visits over a 90 day period. Treatment will include: Manual Therapy, Neuromuscular Re-education, Therapeutic Activities, Therapeutic Exercise, and Home Exercise Program instruction  Date: 5/22/2024             TX#: 3/10 Date: 5/29/2024              TX#: 4/10 Date: 6/17/2024              TX#: 5/10 Date: 6/21/2024  Tx#: 6/10 Date: 6/25/2024  Tx#: 7/10 Date: 6/28/2024  Tx#: 8/10   MT 35 min  Gr III- R and L cervical UPAs  Intermittent manual cervical traction  R and L sideglides to C1-2  SOR  MT 35 min  Gr III- R and L cervical UPAs  Gr III C1/2 and upper thoracic   STM L CPS  Intermittent manual cervical traction  R and L sideglides to C1-2  SOR  MT 32 min  Gr III+ L cervical UPAs  Gr III+ cervical and upper thoracic   Intermittent manual cervical traction  L sideglides to C1-2  SOR  MT 30 min  Gr III+ L cervical UPAs  Gr III+ cervical and upper thoracic  MT 32 min  Gr III+ L cervical UPAs  Gr III+ cervical and upper thoracic   Cervical PROM ext, R and L rot in ext MT 43 min  Gr III+ L cervical UPAs  Gr III+ cervical and upper thoracic   STM L suboccipitals  Cervical PROM ext, R and L rot in ext   TE 8 min  Seated cervical rotation with towel for OP x10 R and L  Seated cervical ext with towel for OP x10 TE 8 min  Cervical rotation with ball at wall x20 R and L  Seated thoracic and cervical extension in chair x10 TE 8  min  Cervical rotation with ball at wall x20 R and L, extension x20   TE 12 min  Cervical rotation with ball at wall x20 R and L, extension x20  Discussion of outcomes and HEP TE 8 min  Cervical rotation with ball at wall x20 R and L, extension x20                    HEP: cervical rot R and L with towel, cervical ext with towel, supine cervical retractions, seated thoracic and cervical ext in chair, cervical ext x10 every 2 hours    Charges: MTx3     Total Timed Treatment: 43 min  Total Treatment Time: 43 min

## 2024-07-01 ENCOUNTER — OFFICE VISIT (OUTPATIENT)
Dept: PHYSICAL THERAPY | Facility: HOSPITAL | Age: 79
End: 2024-07-01
Attending: ORTHOPAEDIC SURGERY
Payer: MEDICARE

## 2024-07-01 PROCEDURE — 97140 MANUAL THERAPY 1/> REGIONS: CPT

## 2024-07-01 NOTE — PROGRESS NOTES
Diagnosis:   Neck pain (M54.2)  Cervical spondylosis (M47.812)        Referring Provider: Troy López  Date of Evaluation:    5/16/2024    Precautions:  Cancer Next MD visit:   none scheduled  Date of Surgery: n/a   Insurance Primary/Secondary: MEDICARE / [employer] [place] INDEMNITY     # Auth Visits: 10            Subjective: The pt states the neck feels like it is at a tolerable point. States his mobility is considerably better, looking up is better and side to side is a lot better. States he felt better after the last session and this lasted. Taking ibuprofen also seems to be helpful.  Pain: 0/10 at rest, 2/10 with extension      Objective:   Observation/Posture: fwd head, B scap in IR  Neuro Screen: intact sensation to light touch BUE     Cervical AROM: (* denotes performed with pain)  Flexion: 78  Extension: 8*  Sidebending: R 24*; L 33  Rotation: R 58*; L 42*     Shoulder AROM  Flexion, abd B WFLs     Accessory motion: general hypomobility throughout upper thoracic and cervical spine, symptoms produced with R and L UPAs to the cervical spine  Palpation: increased tone L vs R CPS and suboccipitals     Strength: (* denotes performed with pain)  UE/Scapular   Shoulder Flex: R 4/5, L 5/5  Shoulder ABD (C5): R 4+/5, L 5/5  Biceps (C6): R 5/5, L 5/5  Wrist ext (C6): R 5/5, L 5/5  Triceps (C7): R 5/5, L 5/5  Wrist Flex (C7): R 5/5, L 5/5  Thumb Ext (C8): R 5/5, L 5/5  Interossei (T1): R 5/5, L 5/5      Strength: R 53.2 lbs; L 55.6 lbs, pt is R dominant         Special tests:   Modified Vertebral Artery: R -, L -  Alar Ligament Testing: R -, L -  Sharp Libertad: -       Assessment: The pt has made consistent gains in ROM over recent visits, with improved carryover between visits as well. Functional limitations are minimal. Will formally reassess ROM next session and will likely DC to HEP.    Goals:   Pt will improve cervical AROM extension by 15 degrees to improve tolerance for putting dishes into overhead cabinets   Pt  will improve cervical AROM rotation to >60 degrees to improve tolerance for turning head to check blind spot while driving  Pt will have improved thoracic PA mobility to WNL to improve cervical ROM as well as promote upright posturing and decreased pain with bed mobility   Pt will report decreased frequency of headaches to <2x/week  Pt will demonstrate improved cervical intrinsic strength to 4/5 to allow improved cervical stabilization with overhead reaching  Pt will be independent and compliant with comprehensive HEP to maintain progress achieved in PT     Plan:  Patient will be seen for 2 x/week or a total of 10 visits over a 90 day period. Treatment will include: Manual Therapy, Neuromuscular Re-education, Therapeutic Activities, Therapeutic Exercise, and Home Exercise Program instruction  Date: 5/29/2024              TX#: 4/10 Date: 6/17/2024              TX#: 5/10 Date: 6/21/2024  Tx#: 6/10 Date: 6/25/2024  Tx#: 7/10 Date: 6/28/2024  Tx#: 8/10 Date: 7/1/2024  Tx#: 9/10   MT 35 min  Gr III- R and L cervical UPAs  Gr III C1/2 and upper thoracic   STM L CPS  Intermittent manual cervical traction  R and L sideglides to C1-2  SOR  MT 32 min  Gr III+ L cervical UPAs  Gr III+ cervical and upper thoracic   Intermittent manual cervical traction  L sideglides to C1-2  SOR  MT 30 min  Gr III+ L cervical UPAs  Gr III+ cervical and upper thoracic  MT 32 min  Gr III+ L cervical UPAs  Gr III+ cervical and upper thoracic   Cervical PROM ext, R and L rot in ext MT 43 min  Gr III+ L cervical UPAs  Gr III+ cervical and upper thoracic   STM L suboccipitals  Cervical PROM ext, R and L rot in ext MT 43 min  Gr III+ L cervical UPAs  Gr III+ cervical and upper thoracic   STM L suboccipitals  Cervical PROM ext, R and L rot in ext   TE 8 min  Cervical rotation with ball at wall x20 R and L  Seated thoracic and cervical extension in chair x10 TE 8 min  Cervical rotation with ball at wall x20 R and L, extension  x20   TE 12 min  Cervical rotation with ball at wall x20 R and L, extension x20  Discussion of outcomes and HEP TE 8 min  Cervical rotation with ball at wall x20 R and L, extension x20                     HEP: cervical rot R and L with towel, cervical ext with towel, supine cervical retractions, seated thoracic and cervical ext in chair, cervical ext x10 every 2 hours    Charges: MTx3     Total Timed Treatment: 43 min  Total Treatment Time: 43 min

## 2024-07-03 ENCOUNTER — OFFICE VISIT (OUTPATIENT)
Dept: PHYSICAL THERAPY | Facility: HOSPITAL | Age: 79
End: 2024-07-03
Attending: ORTHOPAEDIC SURGERY
Payer: MEDICARE

## 2024-07-03 PROCEDURE — 97110 THERAPEUTIC EXERCISES: CPT

## 2024-07-03 PROCEDURE — 97140 MANUAL THERAPY 1/> REGIONS: CPT

## 2024-07-03 NOTE — PROGRESS NOTES
Discharge Summary  Pt has attended 10 visits in Physical Therapy.     Diagnosis:   Neck pain (M54.2)  Cervical spondylosis (M47.812)        Referring Provider: Troy López  Date of Evaluation:    5/16/2024    Precautions:  Cancer Next MD visit:   none scheduled  Date of Surgery: n/a   Insurance Primary/Secondary: MEDICARE / [employer] [place] INDEMNITY     # Auth Visits: 10            Subjective: The pt things feel about the same. No headaches. States he still has pain with looking up and when going over bumps while riding his bike but feels his movement, especially rotation, has improved. Feels comfortable with his HEP and DC today.  Pain: 1/10    Objective:   Observation/Posture: fwd head, B scap in IR  Neuro Screen: intact sensation to light touch BUE     Cervical AROM: (* denotes performed with pain)  Flexion: 88  Extension: 23 \"muscle fatigue\", 33 post-tx  Sidebending: R 28*; L 32  Rotation: R 56*; L 59 \"slight pain\" R and L     Shoulder AROM  Flexion, abd B WFLs     Accessory motion: general hypomobility throughout upper thoracic and cervical spine, symptoms produced with R and L UPAs to the cervical spine  Palpation: increased tone L vs R CPS and suboccipitals     Strength: (* denotes performed with pain)  UE/Scapular   Shoulder Flex: R 4/5, L 5/5  Shoulder ABD (C5): R 4+/5, L 5/5  Biceps (C6): R 5/5, L 5/5  Wrist ext (C6): R 5/5, L 5/5  Triceps (C7): R 5/5, L 5/5  Wrist Flex (C7): R 5/5, L 5/5  Thumb Ext (C8): R 5/5, L 5/5  Interossei (T1): R 5/5, L 5/5      Strength: R 53.2 lbs; L 55.6 lbs, pt is R dominant        Assessment: The pt presents with decreased but not abolished pain, improved cervical ROM that is near WFLs for rotation and significantly improved in all planes, and decreased headaches. He is minimally functionally limited and independent with his HEP. All goals are nearly met, and skilled PT is no longer indicated at this time.    Goals:   Pt will improve cervical AROM extension by 15 degrees to  improve tolerance for putting dishes into overhead cabinets MET  Pt will improve cervical AROM rotation to >60 degrees to improve tolerance for turning head to check blind spot while driving NEARLY MET  Pt will have improved thoracic PA mobility to WNL to improve cervical ROM as well as promote upright posturing and decreased pain with bed mobility PARTIALLY MET  Pt will report decreased frequency of headaches to <2x/week MET  Pt will demonstrate improved cervical intrinsic strength to 4/5 to allow improved cervical stabilization with overhead reaching MET  Pt will be independent and compliant with comprehensive HEP to maintain progress achieved in PT MET    Post Neck Disability Index Score  Post Score: 0 % (7/3/2024 10:09 AM)    10 % improvement    Plan:  DC to HEP  Date: 5/29/2024              TX#: 4/10 Date: 6/17/2024              TX#: 5/10 Date: 6/21/2024  Tx#: 6/10 Date: 6/25/2024  Tx#: 7/10 Date: 6/28/2024  Tx#: 8/10 Date: 7/1/2024  Tx#: 9/10 Date: 7/3/2024  Tx#: 10/10   MT 35 min  Gr III- R and L cervical UPAs  Gr III C1/2 and upper thoracic   STM L CPS  Intermittent manual cervical traction  R and L sideglides to C1-2  SOR  MT 32 min  Gr III+ L cervical UPAs  Gr III+ cervical and upper thoracic   Intermittent manual cervical traction  L sideglides to C1-2  SOR  MT 30 min  Gr III+ L cervical UPAs  Gr III+ cervical and upper thoracic  MT 32 min  Gr III+ L cervical UPAs  Gr III+ cervical and upper thoracic   Cervical PROM ext, R and L rot in ext MT 43 min  Gr III+ L cervical UPAs  Gr III+ cervical and upper thoracic   STM L suboccipitals  Cervical PROM ext, R and L rot in ext MT 43 min  Gr III+ L cervical UPAs  Gr III+ cervical and upper thoracic   STM L suboccipitals  Cervical PROM ext, R and L rot in ext MT 32 min  Gr III+ L cervical UPAs  Gr III+ cervical and upper thoracic   STM L suboccipitals  Cervical PROM ext, R and L rot in ext   TE 8 min  Cervical rotation with ball at wall  x20 R and L  Seated thoracic and cervical extension in chair x10 TE 8 min  Cervical rotation with ball at wall x20 R and L, extension x20   TE 12 min  Cervical rotation with ball at wall x20 R and L, extension x20  Discussion of outcomes and HEP TE 8 min  Cervical rotation with ball at wall x20 R and L, extension x20   TE 8 min  Reassessment                     HEP: cervical rot R and L with towel, cervical ext with towel, supine cervical retractions, seated thoracic and cervical ext in chair, cervical ext x10 every 2 hours, cervical rotation and flex/ext with ball at wall   Charges: MTx2 TEx1     Total Timed Treatment: 40 min  Total Treatment Time: 40 min

## 2024-07-22 ENCOUNTER — OFFICE VISIT (OUTPATIENT)
Dept: OTOLARYNGOLOGY | Facility: CLINIC | Age: 79
End: 2024-07-22
Payer: MEDICARE

## 2024-07-22 VITALS — WEIGHT: 207 LBS | BODY MASS INDEX: 25.21 KG/M2 | HEIGHT: 76 IN

## 2024-07-22 DIAGNOSIS — H61.23 BILATERAL IMPACTED CERUMEN: Primary | ICD-10-CM

## 2024-07-22 PROCEDURE — 69210 REMOVE IMPACTED EAR WAX UNI: CPT | Performed by: SPECIALIST

## 2024-07-22 NOTE — PROGRESS NOTES
Ears = bilateral cerumen occlussions.    Fully cleaned under microscope using instrumentation and suctioning.    Normal tympanic membranes.  Follow up in 6 months time, sooner if problems.

## 2024-07-24 ENCOUNTER — OFFICE VISIT (OUTPATIENT)
Dept: DERMATOLOGY CLINIC | Facility: CLINIC | Age: 79
End: 2024-07-24
Payer: MEDICARE

## 2024-07-24 DIAGNOSIS — D23.9 BENIGN NEOPLASM OF SKIN, UNSPECIFIED LOCATION: ICD-10-CM

## 2024-07-24 DIAGNOSIS — Z08 ENCOUNTER FOR FOLLOW-UP SURVEILLANCE OF SKIN CANCER: ICD-10-CM

## 2024-07-24 DIAGNOSIS — L82.1 OTHER SEBORRHEIC KERATOSIS: ICD-10-CM

## 2024-07-24 DIAGNOSIS — Z85.828 ENCOUNTER FOR FOLLOW-UP SURVEILLANCE OF SKIN CANCER: ICD-10-CM

## 2024-07-24 DIAGNOSIS — D22.9 MULTIPLE NEVI: ICD-10-CM

## 2024-07-24 DIAGNOSIS — L81.4 LENTIGO: ICD-10-CM

## 2024-07-24 DIAGNOSIS — L57.0 AK (ACTINIC KERATOSIS): Primary | ICD-10-CM

## 2024-07-24 PROCEDURE — 99213 OFFICE O/P EST LOW 20 MIN: CPT | Performed by: DERMATOLOGY

## 2024-07-24 PROCEDURE — 17000 DESTRUCT PREMALG LESION: CPT | Performed by: DERMATOLOGY

## 2024-07-24 PROCEDURE — 17003 DESTRUCT PREMALG LES 2-14: CPT | Performed by: DERMATOLOGY

## 2024-08-04 NOTE — PROGRESS NOTES
Clay Contreras is a 79 year old male.  HPI:     CC:    Chief Complaint   Patient presents with    Full Skin Exam     Hx of Aks and BCC.  LOV 2023.  Pt presents for FBSE.  Lesion of concern to the neck and left shoulder, denies bleeding or pain         Allergies:  Pollen and Tramadol    HISTORY:    Past Medical History:    Actinic keratosis    left medial cheek    BCC (basal cell carcinoma)    Face - left lateral cheek    BCC (basal cell carcinoma)    Right temple    BCC (basal cell carcinoma)    Right extensor forearm    Colon carcinoma (HCC)    Family history of heart disease    Family h/o ASHD    High cholesterol    History of hydrocelectomy    Inguinal hernia    Left eye injury         Other and unspecified hyperlipidemia    Poison ivy    Skin cancer, basal cell    R chest ; L lat canthus       Past Surgical History:   Procedure Laterality Date    Chemotherapy      Colectomy      colon CA    Colonoscopy  2012, , , , 3/02, 2000    Dr. Wellington    Colonoscopy N/A 2017    Procedure: COLONOSCOPY;  Surgeon: Sergey Wellington MD;  Location: [facility] ENDOSCOPY    Egd  2008, 3/01    Dr. Wellington    Hernia surgery        Family History   Problem Relation Age of Onset    Stroke Father 80    Breast Cancer Mother 78        transitioned to ovarian       Social History     Socioeconomic History    Marital status:    Tobacco Use    Smoking status: Former     Current packs/day: 0.00     Average packs/day: 1 pack/day for 15.0 years (15.0 ttl pk-yrs)     Types: Cigarettes     Start date: 6/15/1970     Quit date: 6/15/1985     Years since quittin.1     Passive exposure: Past    Smokeless tobacco: Never   Vaping Use    Vaping status: Never Used   Substance and Sexual Activity    Alcohol use: Yes     Alcohol/week: 7.0 standard drinks of alcohol     Types: 7 Cans of beer per week     Comment: Occ    Drug use: No   Other Topics Concern    Caffeine Concern Yes     Comment: Coffee 4  cups daily    Grew up on a farm No    History of tanning Yes    Outdoor occupation No    Pt has a pacemaker No    Pt has a defibrillator No    Reaction to local anesthetic No        Current Outpatient Medications   Medication Sig Dispense Refill    simvastatin 20 MG Oral Tab Take 0.5 tablets (10 mg total) by mouth nightly. 45 tablet 3    tamsulosin 0.4 MG Oral Cap Take 1 capsule (0.4 mg total) by mouth nightly. 90 capsule 3    fluorouracil 5 % External Cream Use twice weekly at night as directed x 6 weeks 40 g 1    Ascorbic Acid (VITAMIN C) 100 MG Oral Tab Take 1 tablet (100 mg total) by mouth daily.      fluticasone propionate 50 MCG/ACT Nasal Suspension 2 sprays by Each Nare route 2 (two) times daily. (Patient taking differently: 2 sprays by Each Nare route as needed.) 2 each 5    Cholecalciferol (VITAMIN D3) 25 MCG Oral Tab Take 1 tablet (1,000 Units total) by mouth daily.      Multiple Vitamins-Minerals (PRESERVISION AREDS) Oral Tab Take 2 tablets by mouth daily.      Glucosamine HCl-MSM (GLUCOSAMINE-MSM) 1500-500 MG/30ML Oral Liquid Take 2 tablets by mouth daily.        loratadine 10 MG Oral Tab Take 1 tablet (10 mg total) by mouth daily as needed.      Multiple Vitamin (MULTI-VITAMINS) Oral Tab Take 1 tablet by mouth daily.      Coenzyme Q10 (COQ-10) 100 MG Oral Cap Take 1 capsule by mouth daily.       Allergies:   Allergies   Allergen Reactions    Pollen      Seasonal allergies    Tramadol UNKNOWN       Past Medical History:    Actinic keratosis    left medial cheek    BCC (basal cell carcinoma)    Face - left lateral cheek    BCC (basal cell carcinoma)    Right temple    BCC (basal cell carcinoma)    Right extensor forearm    Colon carcinoma (HCC)    Family history of heart disease    Family h/o ASHD    High cholesterol    History of hydrocelectomy    Inguinal hernia    Left eye injury    2015     Other and unspecified hyperlipidemia    Poison ivy    Skin cancer, basal cell    R chest 1995; L lat cants       Past Surgical History:   Procedure Laterality Date    Chemotherapy      Colectomy      colon CA    Colonoscopy  2012, , , , 3/02, 2000    Dr. Wellington    Colonoscopy N/A 2017    Procedure: COLONOSCOPY;  Surgeon: Sergey Wellington MD;  Location: [facility] ENDOSCOPY    Egd  2008, 3/01    Dr. Wellington    Hernia surgery       Social History     Socioeconomic History    Marital status:      Spouse name: Not on file    Number of children: Not on file    Years of education: Not on file    Highest education level: Not on file   Occupational History    Not on file   Tobacco Use    Smoking status: Former     Current packs/day: 0.00     Average packs/day: 1 pack/day for 15.0 years (15.0 ttl pk-yrs)     Types: Cigarettes     Start date: 6/15/1970     Quit date: 6/15/1985     Years since quittin.1     Passive exposure: Past    Smokeless tobacco: Never   Vaping Use    Vaping status: Never Used   Substance and Sexual Activity    Alcohol use: Yes     Alcohol/week: 7.0 standard drinks of alcohol     Types: 7 Cans of beer per week     Comment: Occ    Drug use: No    Sexual activity: Not on file   Other Topics Concern     Service Not Asked    Blood Transfusions Not Asked    Caffeine Concern Yes     Comment: Coffee 4 cups daily    Occupational Exposure Not Asked    Hobby Hazards Not Asked    Sleep Concern Not Asked    Stress Concern Not Asked    Weight Concern Not Asked    Special Diet Not Asked    Back Care Not Asked    Exercise Not Asked    Bike Helmet Not Asked    Seat Belt Not Asked    Self-Exams Not Asked    Grew up on a farm No    History of tanning Yes    Outdoor occupation No    Pt has a pacemaker No    Pt has a defibrillator No    Reaction to local anesthetic No   Social History Narrative    Not on file     Social Determinants of Health     Financial Resource Strain: Not on file   Food Insecurity: Not on file   Transportation Needs: Not on file   Physical Activity: Not on file    Stress: Not on file   Social Connections: Not on file   Housing Stability: Not on file     Family History   Problem Relation Age of Onset    Stroke Father 80    Breast Cancer Mother 78        transitioned to ovarian        There were no vitals filed for this visit.    HPI:    Chief Complaint   Patient presents with    Full Skin Exam     Hx of Aks and BCC.  LOV 12/2023.  Pt presents for FBSE.  Lesion of concern to the neck and left shoulder, denies bleeding or pain     Patient here for routine follow-up AK's BCC, biopsy left cheek 1/23 hypertrophic AK and folliculitis    Patient notes previously diagnosed with a \"freckle in the back of his eye\" followed by ophthalmology.  Unchanged per patient.    BCC left cheek post Mohs surgery history of BCC right chest left lateral canthus [id number]    He has been careful to wear sunscreen and hat.   Patient presents with concerns above.    Past notes/ records and appropriate/relevant lab results including pathology and past body maps reviewed. Updated and new information noted in current visit.       Patient has been in their usual state of health.  History, medications, allergies reviewed as noted.      ROS:  Denies any other systemic complaints.  No new or changeing lesions other than noted above. No fevers, chills, night sweats, unusual sun sensitivity.  No other skin complaints.        History, medications, allergies reviewed as noted.       Physical Examination:     Well-developed well-nourished patient alert oriented in no acute distress.  Exam total-body performed, including scalp, head, neck, face,nails, hair, external eyes, including conjunctival mucosa, eyelids, lips external ears, back, chest,/ breasts, axillae,  abdomen, arms, legs, palms.     Multiple light to medium brown, well marginated, uniformly pigmented, macules and papules 6 mm and less scattered on exam. pigmented lesions examined with dermoscopy benign-appearing patterns.     Waxy tannish  keratotic papules scattered, cherry-red vascular papules scattered.    See map today's date for lesions noted .  Otherwise remarkable for lesions as noted on map.  See Assessment /Plan for additional history and physical exam also:    Assessment / plan:    No orders of the defined types were placed in this encounter.      Meds & Refills for this Visit:  Requested Prescriptions      No prescriptions requested or ordered in this encounter         Encounter Diagnoses   Name Primary?    AK (actinic keratosis) Yes    Multiple nevi     Other seborrheic keratosis     Benign neoplasm of skin, unspecified location     Lentigo     Encounter for follow-up surveillance of skin cancer        Left medial cheek AK, folliculitis 1/23    Erythematous scaling keratotic papules noted at sites noted on map  Actinic Keratoses.  Precancerous nature discussed. Sun protection, sunscreen/ blocks encouraged Lesions treated with cryo- .  Biopsy if not resolved.    Right forehead left postauricular neckx3    History of Mohs left cheek, nasal dorsum areas post fluorouracil overall doing well consider repeat careful sun protection.  Overall good results.    Plan 5% fluorouracil twice weekly for 6 weeks to right forehead nasal tip in particular darker patch.    Darker patch at left nasal sidewall cleansed patient has been spray painting.  Inadvertently area of spray paint.  No biopsy at this time    Careful sun protection while outdoors sailing.  Had both his own sailboat is now enjoying sailing outdoors a great deal encourage sun protection    Extensive lentigines, no other suspicious lesions back chest with extensive sun damage lentigines no new suspicious lesions    BCC right temple post Mohs 2021 healing well  BCC 11/21 post cautery healed well    Patient with BCC left lateral cheek post Mohs.  With Dr. Brambila hypertrophic scarring has improved treat with intralesional Kenalog    Extensive benign seborrheic keratosis her shoulders upper back  numerous lentigines  Waxy tan keratotic papules lesions in areas of concern as noted reassurance given.  Benign nature discussed.  Possibility of cryo, alphahydroxy acids over-the-counter retinol's discussed.    Nails with onychomycotic changes   Continue over-the-counter products     patient concerned that cryo had caused BCC to spread  Darker lentigo right distal forearm prior biopsy BCC near observe carefully    History of Efudex, right anterior scalp.  Improved consider repeat course    Right eyelid lesion waxy tan papule likely benign keratosis continue careful monitoring follow-up with ophthalmology for freckle on the back of the eye.  Oculoplastics plastics if necessary.  See ophthalmology regarding lesions along the eyelash margin      Nevi unchanged.  Extensive lesions no new suspicious lesions.    AK's discussed maintenance Efudex therapy once every 1-2 weeks.  And as needed ask 2-3 weeks to individual spots overall this has been helpful.  Refill if needed continue as needed.  Significant improvement.  Will repeat course of Efudex to forehead temples nasal dorsum after the holidays continue careful sun protection.  Continue Efudex      Lesion at left knee darker brown macule longstanding unchanged    No recurrence of previous atypical nevi    No other susupicious lesions on todays  exam.  Please refer to map for specific lesions.  See additional diagnoses.  Pros cons of various therapies, risks benefits discussed.Pathophysiology discussed with patient.  Therapeutic options reviewed.  See  Medications in grid.  Instructions reviewed at length.    Benign nevi, seborrheic  keratoses, cherry angiomas:  Reassurance regarding other benign skin lesions.Signs and symptoms of skin cancer, ABCDE's of melanoma discussed with patient. Sunscreen use, sun protection, self exams reviewed.  Followup as noted RTC routine checkup 6 mos - one year or p.r.n.    Follow-up 6 months or as needed    The patient indicates  understanding of these issues and agrees to the plan.  The patient is asked to return as noted in follow-up/ above.    This note was generated using Dragon voice recognition software.  Please contact me regarding any confusion resulting from errors in recognition.    Encounter Times  Including precharting, reviewing chart, prior notes obtaining history: 5 minutes, medical exam :10 minutes, notes on body map, plan, counseling 10minutes My total time spent caring for the patient on the day of the encounter: 25 minutes      Note to patient and family: The 21st Century Cures Act makes medical notes like these available to patients. However, be advised this is a medical document. It is intended as [id number] communication and monitoring of a patient's care needs. It is written in medical language and may contain abbreviations or verbiage that are unfamiliar. It may appear blunt or direct. Medical documents are intended to carry relevant information, facts as evident and the clinical opinion of the practitioner.

## 2024-10-16 ENCOUNTER — TELEPHONE (OUTPATIENT)
Dept: INTERNAL MEDICINE CLINIC | Facility: CLINIC | Age: 79
End: 2024-10-16

## 2024-10-16 ENCOUNTER — PATIENT MESSAGE (OUTPATIENT)
Dept: INTERNAL MEDICINE CLINIC | Facility: CLINIC | Age: 79
End: 2024-10-16

## 2024-10-16 NOTE — TELEPHONE ENCOUNTER
Clay JENKINS [facility] Clinical Staff (supporting Jeff Anthony D'Amico, DO)34 minutes ago (3:46 PM)       I recently received a high dose flu shot from [facility]. 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 They also offered me a free Covid shot. I have had them in the past, with no reaction, other than a sore arm and a mild headache. I have also had Covid on two occassions,  the most recent being April of this year, with mild symptoms. I'm not anxious to continue to pump chemicals into my body, but what do you think? Should I get it?   Demetris Contreras         To Dr. D'Amico--

## 2024-10-17 NOTE — TELEPHONE ENCOUNTER
If 6 months has elapsed from his last COVID vaccination or COVID disease he should be considering getting that again.  He can choose what he wants to do, he is at risk.

## 2024-12-11 ENCOUNTER — TELEPHONE (OUTPATIENT)
Dept: INTERNAL MEDICINE CLINIC | Facility: CLINIC | Age: 79
End: 2024-12-11

## 2024-12-11 ENCOUNTER — PATIENT MESSAGE (OUTPATIENT)
Dept: INTERNAL MEDICINE CLINIC | Facility: CLINIC | Age: 79
End: 2024-12-11

## 2024-12-11 DIAGNOSIS — R79.89 LOW TESTOSTERONE: ICD-10-CM

## 2024-12-11 DIAGNOSIS — Z00.00 ANNUAL PHYSICAL EXAM: ICD-10-CM

## 2024-12-11 DIAGNOSIS — E55.9 VITAMIN D DEFICIENCY: ICD-10-CM

## 2024-12-11 DIAGNOSIS — E78.5 HYPERLIPIDEMIA, UNSPECIFIED HYPERLIPIDEMIA TYPE: Primary | ICD-10-CM

## 2024-12-11 DIAGNOSIS — Z12.5 ENCOUNTER FOR PROSTATE CANCER SCREENING: ICD-10-CM

## 2024-12-11 NOTE — TELEPHONE ENCOUNTER
Called patient and left Dr BARRAZA message.    Informed patient to call back with questions    HIPAA verified

## 2024-12-11 NOTE — TELEPHONE ENCOUNTER
humphrey [facility] Clinical Staff (supporting Jeff Anthony D'Amico, DO)3 minutes ago (8:53 AM)       I'm scheduled for annual checkup with you on  2/25/25. Please write order for lipid panel and psa screening, so we can discuss during my visit. Demetris    Please advise on pending labs for MA visit thanks - to

## 2025-01-24 RX ORDER — SIMVASTATIN 20 MG
10 TABLET ORAL NIGHTLY
Qty: 45 TABLET | Refills: 0 | Status: SHIPPED | OUTPATIENT
Start: 2025-01-24

## 2025-01-24 NOTE — TELEPHONE ENCOUNTER
Refill request is for a maintenance medication and has met the criteria specified in the Ambulatory Medication Refill Standing Order for eligibility, visits, laboratory, alerts and was sent to the requested pharmacy.    Requested Prescriptions     Signed Prescriptions Disp Refills    simvastatin 20 MG Oral Tab 45 tablet 0     Sig: Take 0.5 tablets (10 mg total) by mouth nightly.     Authorizing Provider: D'AMICO, JEFF ANTHONY     Ordering User: PRASANNA JOHN      Patient is scheduled to see Dr.Damico on 2/25/2025.

## 2025-02-19 ENCOUNTER — LAB ENCOUNTER (OUTPATIENT)
Dept: LAB | Facility: HOSPITAL | Age: 80
End: 2025-02-19
Attending: INTERNAL MEDICINE
Payer: MEDICARE

## 2025-02-19 DIAGNOSIS — E78.5 HYPERLIPIDEMIA, UNSPECIFIED HYPERLIPIDEMIA TYPE: ICD-10-CM

## 2025-02-19 DIAGNOSIS — Z12.5 ENCOUNTER FOR PROSTATE CANCER SCREENING: ICD-10-CM

## 2025-02-19 DIAGNOSIS — E55.9 VITAMIN D DEFICIENCY: ICD-10-CM

## 2025-02-19 LAB
ALBUMIN SERPL-MCNC: 4.4 G/DL (ref 3.2–4.8)
ALBUMIN/GLOB SERPL: 1.7 {RATIO} (ref 1–2)
ALP LIVER SERPL-CCNC: 83 U/L
ALT SERPL-CCNC: 19 U/L
ANION GAP SERPL CALC-SCNC: 6 MMOL/L (ref 0–18)
AST SERPL-CCNC: 22 U/L (ref ?–34)
BASOPHILS # BLD AUTO: 0.07 X10(3) UL (ref 0–0.2)
BASOPHILS NFR BLD AUTO: 1 %
BILIRUB SERPL-MCNC: 1 MG/DL (ref 0.2–1.1)
BILIRUB UR QL: NEGATIVE
BUN BLD-MCNC: 17 MG/DL (ref 9–23)
BUN/CREAT SERPL: 13.9 (ref 10–20)
CALCIUM BLD-MCNC: 9.4 MG/DL (ref 8.7–10.4)
CHLORIDE SERPL-SCNC: 106 MMOL/L (ref 98–112)
CHOLEST SERPL-MCNC: 165 MG/DL (ref ?–200)
CLARITY UR: CLEAR
CO2 SERPL-SCNC: 30 MMOL/L (ref 21–32)
COMPLEXED PSA SERPL-MCNC: 2.76 NG/ML (ref ?–4)
CREAT BLD-MCNC: 1.22 MG/DL
DEPRECATED RDW RBC AUTO: 45.8 FL (ref 35.1–46.3)
EGFRCR SERPLBLD CKD-EPI 2021: 61 ML/MIN/1.73M2 (ref 60–?)
EOSINOPHIL # BLD AUTO: 0.26 X10(3) UL (ref 0–0.7)
EOSINOPHIL NFR BLD AUTO: 3.6 %
ERYTHROCYTE [DISTWIDTH] IN BLOOD BY AUTOMATED COUNT: 12.8 % (ref 11–15)
FASTING PATIENT LIPID ANSWER: YES
FASTING STATUS PATIENT QL REPORTED: YES
GLOBULIN PLAS-MCNC: 2.6 G/DL (ref 2–3.5)
GLUCOSE BLD-MCNC: 94 MG/DL (ref 70–99)
GLUCOSE UR-MCNC: NORMAL MG/DL
HCT VFR BLD AUTO: 47.7 %
HDLC SERPL-MCNC: 44 MG/DL (ref 40–59)
HGB BLD-MCNC: 15.9 G/DL
HGB UR QL STRIP.AUTO: NEGATIVE
IMM GRANULOCYTES # BLD AUTO: 0.02 X10(3) UL (ref 0–1)
IMM GRANULOCYTES NFR BLD: 0.3 %
KETONES UR-MCNC: NEGATIVE MG/DL
LDLC SERPL CALC-MCNC: 100 MG/DL (ref ?–100)
LEUKOCYTE ESTERASE UR QL STRIP.AUTO: NEGATIVE
LYMPHOCYTES # BLD AUTO: 2.03 X10(3) UL (ref 1–4)
LYMPHOCYTES NFR BLD AUTO: 27.9 %
MCH RBC QN AUTO: 32.4 PG (ref 26–34)
MCHC RBC AUTO-ENTMCNC: 33.3 G/DL (ref 31–37)
MCV RBC AUTO: 97.3 FL
MONOCYTES # BLD AUTO: 0.61 X10(3) UL (ref 0.1–1)
MONOCYTES NFR BLD AUTO: 8.4 %
NEUTROPHILS # BLD AUTO: 4.28 X10 (3) UL (ref 1.5–7.7)
NEUTROPHILS # BLD AUTO: 4.28 X10(3) UL (ref 1.5–7.7)
NEUTROPHILS NFR BLD AUTO: 58.8 %
NITRITE UR QL STRIP.AUTO: NEGATIVE
NONHDLC SERPL-MCNC: 121 MG/DL (ref ?–130)
OSMOLALITY SERPL CALC.SUM OF ELEC: 295 MOSM/KG (ref 275–295)
PH UR: 6.5 [PH] (ref 5–8)
PLATELET # BLD AUTO: 293 10(3)UL (ref 150–450)
POTASSIUM SERPL-SCNC: 4.3 MMOL/L (ref 3.5–5.1)
PROT SERPL-MCNC: 7 G/DL (ref 5.7–8.2)
PROT UR-MCNC: NEGATIVE MG/DL
RBC # BLD AUTO: 4.9 X10(6)UL
SODIUM SERPL-SCNC: 142 MMOL/L (ref 136–145)
SP GR UR STRIP: 1.02 (ref 1–1.03)
TRIGL SERPL-MCNC: 114 MG/DL (ref 30–149)
TSI SER-ACNC: 2.71 UIU/ML (ref 0.55–4.78)
UROBILINOGEN UR STRIP-ACNC: NORMAL
VIT D+METAB SERPL-MCNC: 54.9 NG/ML (ref 30–100)
VLDLC SERPL CALC-MCNC: 19 MG/DL (ref 0–30)
WBC # BLD AUTO: 7.3 X10(3) UL (ref 4–11)

## 2025-02-19 PROCEDURE — 82306 VITAMIN D 25 HYDROXY: CPT

## 2025-02-19 PROCEDURE — 36415 COLL VENOUS BLD VENIPUNCTURE: CPT

## 2025-02-19 PROCEDURE — 84410 TESTOSTERONE BIOAVAILABLE: CPT | Performed by: INTERNAL MEDICINE

## 2025-02-19 PROCEDURE — 81003 URINALYSIS AUTO W/O SCOPE: CPT

## 2025-02-19 PROCEDURE — 84443 ASSAY THYROID STIM HORMONE: CPT

## 2025-02-19 PROCEDURE — 80053 COMPREHEN METABOLIC PANEL: CPT

## 2025-02-19 PROCEDURE — 80061 LIPID PANEL: CPT

## 2025-02-19 PROCEDURE — 85025 COMPLETE CBC W/AUTO DIFF WBC: CPT

## 2025-02-25 ENCOUNTER — OFFICE VISIT (OUTPATIENT)
Dept: INTERNAL MEDICINE CLINIC | Facility: CLINIC | Age: 80
End: 2025-02-25
Payer: MEDICARE

## 2025-02-25 VITALS
OXYGEN SATURATION: 96 % | HEART RATE: 82 BPM | TEMPERATURE: 98 F | WEIGHT: 211.81 LBS | DIASTOLIC BLOOD PRESSURE: 70 MMHG | SYSTOLIC BLOOD PRESSURE: 126 MMHG | BODY MASS INDEX: 26.34 KG/M2 | HEIGHT: 75 IN

## 2025-02-25 DIAGNOSIS — N39.41 URGE INCONTINENCE: ICD-10-CM

## 2025-02-25 DIAGNOSIS — E78.1 PURE HYPERTRIGLYCERIDEMIA: ICD-10-CM

## 2025-02-25 DIAGNOSIS — H54.7 VISION LOSS: ICD-10-CM

## 2025-02-25 DIAGNOSIS — H26.9 CATARACT OF BOTH EYES, UNSPECIFIED CATARACT TYPE: ICD-10-CM

## 2025-02-25 DIAGNOSIS — R97.20 RISING PSA LEVEL: ICD-10-CM

## 2025-02-25 DIAGNOSIS — Z85.038 HISTORY OF COLON CANCER: ICD-10-CM

## 2025-02-25 DIAGNOSIS — H35.30 MACULAR DEGENERATION OF BOTH EYES, UNSPECIFIED TYPE: ICD-10-CM

## 2025-02-25 DIAGNOSIS — Z00.00 ENCOUNTER FOR ANNUAL HEALTH EXAMINATION: Primary | ICD-10-CM

## 2025-02-25 DIAGNOSIS — L57.0 ACTINIC KERATOSIS: ICD-10-CM

## 2025-02-25 PROCEDURE — 99214 OFFICE O/P EST MOD 30 MIN: CPT | Performed by: INTERNAL MEDICINE

## 2025-02-25 PROCEDURE — G0439 PPPS, SUBSEQ VISIT: HCPCS | Performed by: INTERNAL MEDICINE

## 2025-02-25 NOTE — PROGRESS NOTES
Subjective:   Clay Contreras is a 79 year old male who presents for a Medicare Wellness Visit charge within the last 11 months and Patient may not meet criteria for AWV: Please evaluate for correct coding and scheduled follow up of multiple significant but stable problems.     Chief Complaint   Patient presents with    Well Adult     Medicare annual       Patient presents for a Medicare Subsequent Annual Wellness visit and  followup regarding chronic medical problems and/or refills as listed below in the assessment and plan    Patient is here today for physical exam, patient is up-to-date with age-related standard of care screening and vaccination recommendations, this was discussed at length and they verbalized understanding of any deficiencies.    Hypercholesterolemia: Patient seems stable, mild elevated cholesterol levels in his lifetime.  Has been taking half tablets of simvastatin as well as fish oil as of lately, having trouble tolerating the fish oil, we did discuss this at length.    Osteoarthritis: Patient does have a history of osteoarthritis, claims the joints of been steady he has been very active, he is considering moving to an assisted living center, the [place] with his wife over the next few years, he does have a son in the area as well.    History/Other:   Fall Risk Assessment:   He has been screened for Falls and is low risk.      Cognitive Assessment:   He had a completely normal cognitive assessment - see flowsheet entries     Functional Ability/Status:   Clay Cotnreras has some abnormal functions as listed below:  He has Hearing problems based on screening of functional status.He has Vision problems based on screening of functional status.       Depression Screening (PHQ):  PHQ-2 SCORE: 0  , done 2/25/2025             Advanced Directives:   He has a Living Will on file in [facility]; reviewed and discussed documents with patient (and family/surrogate if present).  He does have a POA but  we do NOT have it on file in Epic.    Discussed Advance Care Planning with patient (and family/surrogate if present). Standard forms made available to patient in After Visit Summary.      Patient Active Problem List   Diagnosis    Actinic keratosis    Hyperlipidemia    History of colon cancer    Macular degeneration of both eyes    Urge incontinence    Vision loss    Cataract of both eyes     Allergies:  He is allergic to pollen and tramadol.    Current Medications:  Outpatient Medications Marked as Taking for the 2/25/25 encounter (Office Visit) with D'Amico, Jeff Anthony, DO   Medication Sig    simvastatin 20 MG Oral Tab Take 0.5 tablets (10 mg total) by mouth nightly.    fluorouracil 5 % External Cream Use twice weekly at night as directed x 6 weeks (Patient taking differently: Use twice weekly at night as directed as needed)    Ascorbic Acid (VITAMIN C) 100 MG Oral Tab Take 1 tablet (100 mg total) by mouth daily.    fluticasone propionate 50 MCG/ACT Nasal Suspension 2 sprays by Each Nare route 2 (two) times daily. (Patient taking differently: 2 sprays by Each Nare route as needed.)    Cholecalciferol (VITAMIN D3) 25 MCG Oral Tab Take 1 tablet (1,000 Units total) by mouth daily.    Multiple Vitamins-Minerals (PRESERVISION AREDS) Oral Tab Take 2 tablets by mouth daily.    Glucosamine HCl-MSM (GLUCOSAMINE-MSM) 1500-500 MG/30ML Oral Liquid Take 2 tablets by mouth daily.      loratadine 10 MG Oral Tab Take 1 tablet (10 mg total) by mouth daily as needed.    Multiple Vitamin (MULTI-VITAMINS) Oral Tab Take 1 tablet by mouth daily.    Coenzyme Q10 (COQ-10) 100 MG Oral Cap Take 1 capsule by mouth daily.       Medical History:  He  has a past medical history of Actinic keratosis (2023), BCC (basal cell carcinoma) (03/2021), BCC (basal cell carcinoma) (11/2021), BCC (basal cell carcinoma) (11/2021), Colon carcinoma (HCC) (2000), Family history of heart disease, High cholesterol, History of hydrocelectomy (2010), Inguinal  hernia, Left eye injury, Other and unspecified hyperlipidemia, Poison ivy, and Skin cancer, basal cell.  Surgical History:  He  has a past surgical history that includes Colectomy (); chemotherapy (); hernia surgery; colonoscopy (2012, , , , 3/02, ); egd (2008, 3/01); and colonoscopy (N/A, 2017).   Family History:  His family history includes Breast Cancer (age of onset: 78) in his mother; Stroke (age of onset: 80) in his father.  Social History:  He  reports that he quit smoking about 39 years ago. His smoking use included cigarettes. He started smoking about 54 years ago. He has a 15 pack-year smoking history. He has been exposed to tobacco smoke. He has never used smokeless tobacco. He reports current alcohol use of about 7.0 standard drinks of alcohol per week. He reports that he does not use drugs.    Tobacco:  He smoked tobacco in the past but quit greater than 12 months ago.  Social History     Tobacco Use   Smoking Status Former    Current packs/day: 0.00    Average packs/day: 1 pack/day for 15.0 years (15.0 ttl pk-yrs)    Types: Cigarettes    Start date: 6/15/1970    Quit date: 6/15/1985    Years since quittin.7    Passive exposure: Past   Smokeless Tobacco Never        CAGE Alcohol Screen:   CAGE screening score of 0 on 2025, showing low risk of alcohol abuse.      Patient Care Team:  D'Amico, Jeff Anthony, DO as PCP - General (Internal Medicine)  Sergey Wellington MD (GASTROENTEROLOGY)  Gabby Hensley PT as Physical Therapist (Physical Therapy)    Review of Systems  Constitutional: Negative for Chills, fatigue, fever, malaise, weight gain and weight loss.  ENMT: Negative for Nasal drainage and sinus pressure.  Eyes: Negative for Vision changes.  Respiratory: Negative for Cough, dyspnea and wheezing.  Cardio: Negative Chest pain and irregular heartbeat/palpitations.  GI: Negative for Abdominal pain, constipation, diarrhea, heartburn, nausea and  vomiting.  : Negative for Dysuria and urinary frequency.  Endocrine: Negative for Cold intolerance and heat intolerance.  Neuro: Negative for Gait disturbance and memory impairment.  Psych: Negative for Anxiety and depression.  Integumentary: Negative for Hives and rash.  MS: Negative muscle weakness. pos for joint pain  Hema/Lymph: Negative Easy bleeding and easy bruising.  Allergic/Immuno: Negative Environmental allergies and food allergies.      Objective:   Physical Exam    PHYSICAL EXAMINATION:  Vital signs: See chart   Gen. exam: Alert and oriented, in no acute distress   HEENT: Pupils equal and reactive to light and accommodation, moist mucous membranes  Neck exam:  Supple with baseline range of motion.  Normal thyroid trachea midline, no JVD  Heart exam: Regular rate and rhythm no murmurs no S3 no S4   Lung exam: No rales no rhonchi no wheezes  Abdominal exam: Soft nontender, nondistended positive bowel sounds are normoactive  Extremities exam: no clubbing no cyanosis no edema  Skin exam: see wound notes for details, no rashes  Neurological exam: Cranial nerves II through XII intact, no gross deficits  Musculoskeletal exam: mod bilateral generalized hand arthritis appreciated, no obvious deformity  : deferred to urology  /70 (BP Location: Right arm, Patient Position: Sitting, Cuff Size: adult)   Pulse 82   Temp 98 °F (36.7 °C) (Oral)   Ht 6' 3\" (1.905 m)   Wt 211 lb 12.8 oz (96.1 kg)   SpO2 96%   BMI 26.47 kg/m²  Estimated body mass index is 26.47 kg/m² as calculated from the following:    Height as of this encounter: 6' 3\" (1.905 m).    Weight as of this encounter: 211 lb 12.8 oz (96.1 kg).    Medicare Hearing Assessment:   Hearing Screening    Screening Method: Whisper Test  Whisper Test Result: Pass (Comment: hearing aids)               Assessment & Plan:   Clay Contreras is a 79 year old male who presents for a Medicare Assessment.     1. Encounter for annual health examination  (Primary)  2. Actinic keratosis  -     Detailed, Mod Complex (97279)  3. Pure hypertriglyceridemia  -     Detailed, Mod Complex (88841)  4. History of colon cancer  -     Detailed, Mod Complex (99759)  5. Macular degeneration of both eyes, unspecified type  -     Detailed, Mod Complex (84074)  6. Urge incontinence  -     Detailed, Mod Complex (51416)  7. Vision loss  -     Detailed, Mod Complex (49128)  8. Cataract of both eyes, unspecified cataract type  -     Detailed, Mod Complex (83248)  9. Rising PSA level  -     PSA Total, Diagnostic; Future; Expected date: 08/25/2025  1. Encounter for annual health examination  Physical exam instruction: Improve diet and exercise    2. Actinic keratosis  Stable cont monitoring and management  - Detailed, Mod Complex (87944)    3. Pure hypertriglyceridemia  Stable cont monitoring and management  - Detailed, Mod Complex (91644)    4. History of colon cancer  Stable cont monitoring and management  - Detailed, Mod Complex (92328)    5. Macular degeneration of both eyes, unspecified type  Stable cont monitoring and management  - Detailed, Mod Complex (08427)    6. Urge incontinence  Stable cont monitoring and management  - Detailed, Mod Complex (27640)    7. Vision loss  Stable cont monitoring and management  - Detailed, Mod Complex (34938)    8. Cataract of both eyes, unspecified cataract type  Stable cont monitoring and management  - Detailed, Mod Complex (71392)    9. Rising PSA level  Stable cont monitoring and management, I like the idea of the repeat PSA in 6 months, diagnostic with pelvic rest 3 to 5 days before doing the lab testing and I will notify with results once completed.  - PSA - Diagnostic [E]; Future    The patient indicates understanding of these issues and agrees to the plan.  Reinforced healthy diet, lifestyle, and exercise.      No follow-ups on file.     Jeff Anthony D'Amico, DO, 2/25/2025     Supplementary Documentation:   General Health:  In the past six  months, have you lost more than 10 pounds without trying?: 2 - No  Has your appetite been poor?: No  Type of Diet: Balanced  How does the patient maintain a good energy level?: Daily Walks  How would you describe your daily physical activity?: Light  How would you describe your current health state?: Good  How do you maintain positive mental well-being?: Social Interaction;Puzzles  On a scale of 0 to 10, with 0 being no pain and 10 being severe pain, what is your pain level?: 0 - (None)  In the past six months, have you experienced urine leakage?: 0-No  At any time do you feel concerned for the safety/well-being of yourself and/or your children, in your home or elsewhere?: No  Have you had any immunizations at another office such as Influenza, Hepatitis B, Tetanus, or Pneumococcal?: Yes    Health Maintenance   Topic Date Due    Zoster Vaccines (2 of 2) 03/25/2025 (Originally 10/19/2020)    COVID-19 Vaccine (7 - 2024-25 season) 03/25/2025 (Originally 9/1/2024)    Influenza Vaccine (1) 06/30/2025 (Originally 10/1/2024)    Colorectal Cancer Screening  12/06/2025    Annual Physical  02/25/2026    Annual Depression Screening  Completed    Fall Risk Screening (Annual)  Completed    Pneumococcal Vaccine: 50+ Years  Completed    Meningococcal B Vaccine  Aged Out

## 2025-02-25 NOTE — PATIENT INSTRUCTIONS
1. Encounter for annual health examination  Physical exam instruction: Improve diet and exercise    2. Actinic keratosis  Stable cont monitoring and management  - Detailed, Mod Complex (06929)    3. Pure hypertriglyceridemia  Stable cont monitoring and management  - Detailed, Mod Complex (86943)    4. History of colon cancer  Stable cont monitoring and management  - Detailed, Mod Complex (73719)    5. Macular degeneration of both eyes, unspecified type  Stable cont monitoring and management  - Detailed, Mod Complex (12219)    6. Urge incontinence  Stable cont monitoring and management  - Detailed, Mod Complex (35242)    7. Vision loss  Stable cont monitoring and management  - Detailed, Mod Complex (51550)    8. Cataract of both eyes, unspecified cataract type  Stable cont monitoring and management  - Detailed, Mod Complex (91474)    9. Rising PSA level  Stable cont monitoring and management, I like the idea of the repeat PSA in 6 months, diagnostic with pelvic rest 3 to 5 days before doing the lab testing and I will notify with results once completed.  - PSA - Diagnostic [E]; Future

## 2025-02-27 ENCOUNTER — OFFICE VISIT (OUTPATIENT)
Dept: OTOLARYNGOLOGY | Facility: CLINIC | Age: 80
End: 2025-02-27
Payer: MEDICARE

## 2025-02-27 VITALS — HEIGHT: 75 IN | WEIGHT: 211 LBS | BODY MASS INDEX: 26.24 KG/M2

## 2025-02-27 DIAGNOSIS — H61.23 CERUMEN DEBRIS ON TYMPANIC MEMBRANE OF BOTH EARS: Primary | ICD-10-CM

## 2025-02-27 LAB
SEX HORM BIND GLOB: 42.4 NMOL/L
TESTOST % FREE+WEAK BND: 9.3 %
TESTOST FREE+WEAK BND: 51.1 NG/DL
TESTOSTERONE TOT /MS: 549.7 NG/DL

## 2025-02-27 PROCEDURE — 69210 REMOVE IMPACTED EAR WAX UNI: CPT | Performed by: SPECIALIST

## 2025-03-04 ENCOUNTER — OFFICE VISIT (OUTPATIENT)
Dept: ORTHOPEDICS CLINIC | Facility: CLINIC | Age: 80
End: 2025-03-04
Payer: MEDICARE

## 2025-03-04 VITALS — HEART RATE: 70 BPM | SYSTOLIC BLOOD PRESSURE: 142 MMHG | DIASTOLIC BLOOD PRESSURE: 85 MMHG

## 2025-03-04 DIAGNOSIS — M25.511 RIGHT SHOULDER PAIN, UNSPECIFIED CHRONICITY: ICD-10-CM

## 2025-03-04 DIAGNOSIS — M12.811 RIGHT ROTATOR CUFF TEAR ARTHROPATHY: ICD-10-CM

## 2025-03-04 DIAGNOSIS — M19.011 PRIMARY OSTEOARTHRITIS OF RIGHT SHOULDER: Primary | ICD-10-CM

## 2025-03-04 DIAGNOSIS — M75.101 RIGHT ROTATOR CUFF TEAR ARTHROPATHY: ICD-10-CM

## 2025-03-04 PROCEDURE — 99213 OFFICE O/P EST LOW 20 MIN: CPT | Performed by: ORTHOPAEDIC SURGERY

## 2025-03-04 PROCEDURE — 20610 DRAIN/INJ JOINT/BURSA W/O US: CPT | Performed by: ORTHOPAEDIC SURGERY

## 2025-03-04 RX ORDER — TRIAMCINOLONE ACETONIDE 40 MG/ML
40 INJECTION, SUSPENSION INTRA-ARTICULAR; INTRAMUSCULAR ONCE
Status: COMPLETED | OUTPATIENT
Start: 2025-03-04 | End: 2025-03-05

## 2025-03-05 RX ADMIN — TRIAMCINOLONE ACETONIDE 40 MG: 40 INJECTION, SUSPENSION INTRA-ARTICULAR; INTRAMUSCULAR at 08:14:00

## 2025-03-05 NOTE — PROGRESS NOTES
Per verbal order from Dr. López, draw up 5ml of 0.5% Marcaine and 1ml of Kenalog 40 for cortisone injection to right shoulder. Helen NY MA  Patient provided education handout for cortisone injection.    Composite Graft Text: The defect edges were debeveled with a #15 scalpel blade.  Given the location of the defect, shape of the defect, the proximity to free margins and the fact the defect was full thickness a composite graft was deemed most appropriate.  The defect was outline and then transferred to the donor site.  A full thickness graft was then excised from the donor site. The graft was then placed in the primary defect, oriented appropriately and then sutured into place.  The secondary defect was then repaired using a primary closure.

## 2025-03-05 NOTE — PROGRESS NOTES
NURSING INTAKE COMMENTS:   Chief Complaint   Patient presents with    Shoulder Pain     R shoulder f/u- had injection on 5/14/2024 that helped for 9 mos- rates pain 0-8/10 only w/ certain movements- pt requesting another injection today       HPI: This 79 year old male presents today known osteoarthritic changes to the glenohumeral joint right shoulder.  Injection May 2024 lasted for 8 months and for the last month the pain has been returning.  He said his shoulder was functioning nearly normally up until about a month ago.  He wants another cortisone injection.  He is not interested in shoulder replacement.    Socially he inform me that he and his wife are starting to empty their home of 50 years.  They are going to be moving to the [place] over the next year or so.  Remains healthy and independent.    Past Medical History:    Actinic keratosis    left medial cheek    BCC (basal cell carcinoma)    Face - left lateral cheek    BCC (basal cell carcinoma)    Right temple    BCC (basal cell carcinoma)    Right extensor forearm    Colon carcinoma (HCC)    Family history of heart disease    Family h/o ASHD    High cholesterol    History of hydrocelectomy    Inguinal hernia    Left eye injury    2015     Other and unspecified hyperlipidemia    Poison ivy    Skin cancer, basal cell    R chest 1995; L lat [facility] 1997     Past Surgical History:   Procedure Laterality Date    Chemotherapy  2000    Colectomy  2000    colon CA    Colonoscopy  03/16/2012, 12/08, 6/04, 4/03, 3/02, 2000    Dr. Wellington    Colonoscopy N/A 11/1/2017    Procedure: COLONOSCOPY;  Surgeon: Sergey Wellington MD;  Location: [facility] ENDOSCOPY    Egd  12/2008, 3/01    Dr. Wellington    Hernia surgery       Current Outpatient Medications   Medication Sig Dispense Refill    simvastatin 20 MG Oral Tab Take 0.5 tablets (10 mg total) by mouth nightly. 45 tablet 0    fluorouracil 5 % External Cream Use twice weekly at night as directed x 6 weeks (Patient taking differently:  Use twice weekly at night as directed as needed) 40 g 1    Ascorbic Acid (VITAMIN C) 100 MG Oral Tab Take 1 tablet (100 mg total) by mouth daily.      fluticasone propionate 50 MCG/ACT Nasal Suspension 2 sprays by Each Nare route 2 (two) times daily. (Patient taking differently: 2 sprays by Each Nare route as needed.) 2 each 5    Cholecalciferol (VITAMIN D3) 25 MCG Oral Tab Take 1 tablet (1,000 Units total) by mouth daily.      Multiple Vitamins-Minerals (PRESERVISION AREDS) Oral Tab Take 2 tablets by mouth daily.      Glucosamine HCl-MSM (GLUCOSAMINE-MSM) 1500-500 MG/30ML Oral Liquid Take 2 tablets by mouth daily.        loratadine 10 MG Oral Tab Take 1 tablet (10 mg total) by mouth daily as needed.      Multiple Vitamin (MULTI-VITAMINS) Oral Tab Take 1 tablet by mouth daily.      Coenzyme Q10 (COQ-10) 100 MG Oral Cap Take 1 capsule by mouth daily.       Allergies[1]  Family History   Problem Relation Age of Onset    Stroke Father 80    Breast Cancer Mother 78        transitioned to ovarian      No family Hx of DVT/PE    Social History     Occupational History    Not on file   Tobacco Use    Smoking status: Former     Current packs/day: 0.00     Average packs/day: 1 pack/day for 15.0 years (15.0 ttl pk-yrs)     Types: Cigarettes     Start date: 6/15/1970     Quit date: 6/15/1985     Years since quittin.7     Passive exposure: Past    Smokeless tobacco: Never   Vaping Use    Vaping status: Never Used   Substance and Sexual Activity    Alcohol use: Yes     Alcohol/week: 7.0 standard drinks of alcohol     Types: 7 Cans of beer per week     Comment: Occ    Drug use: No    Sexual activity: Not on file        Review of Systems:  GENERAL: feels generally well, no significant weight loss or weight gain  SKIN: no ulcerated or worrisome skin lesions  EYES:denies blurred vision or double vision  HEENT: denies new nasal congestion, sinus pain or ST  LUNGS: denies shortness of breath  CARDIOVASCULAR: denies chest pain  GI:  no hematemesis, no worsening heartburn, no diarrhea  : no dysuria, no blood in urine, no difficulty urinating, no incontinence  MUSCULOSKELETAL: no other musculoskeletal complaints other than in HPI  NEURO: no numbness or tingling, no weakness or balance disorder  PSYCHE: no depression or anxiety  HEMATOLOGIC: no hx of blood dyscrasia, no Hx DVT/PE  ENDOCRINE: no thyroid or diabetes issues  ALL/ASTHMA: no new hx of severe allergy or asthma    Physical Examination:    /85   Pulse 70   Constitutional: appears well hydrated, alert and responsive, no acute distress noted  Extremities: Right shoulder contour is symmetric and normal to the left.  He has full motion but he does have a hitch.  Musculoskeletal: Some weakness of the supraspinatus but it is known that his rotator cuff is no longer intact.  1 episode of crepitus during the exam.  No acromioclavicular tenderness.  Neurological: Normal motor and sensory right upper extremity distal to the shoulder.    Imaging: None taken today      No results found.     Lab Results   Component Value Date    WBC 7.3 02/19/2025    HGB 15.9 02/19/2025    .0 02/19/2025      Lab Results   Component Value Date    GLU 94 02/19/2025    BUN 17 02/19/2025    CREATSERUM 1.22 02/19/2025    GFRNAA 70 11/10/2021    GFRAA 81 11/10/2021        Assessment and Plan:  Diagnoses and all orders for this visit:    Primary osteoarthritis of right shoulder  -     Arthrocentesis aspiration and injection major Right joint bursa w/o US  -     triamcinolone acetonide (Kenalog-40) 40 MG/ML injection 40 mg    Right rotator cuff tear arthropathy  -     Arthrocentesis aspiration and injection major Right joint bursa w/o US  -     triamcinolone acetonide (Kenalog-40) 40 MG/ML injection 40 mg    Right shoulder pain, unspecified chronicity  -     Arthrocentesis aspiration and injection major Right joint bursa w/o US  -     triamcinolone acetonide (Kenalog-40) 40 MG/ML injection 40  mg        Assessment: Rotator cuff arthropathy as the primary diagnosis.    Plan: She desired a cortisone injection.  Aspiration was negative and he tolerated the injection bupivacaine 0.5% 5 cc and Kenalog 1 cc well to the right shoulder.    He can have these injections every 4 months at a maximum.  I let him know that we are leaving the clinic as of 3/15/2025.  I told him he is welcome to follow-up with me in my private office on [facility] stay with the new orthopedic surgeons here at the clinic.  I told him it is his choice.  For now I will see him as needed.    Follow Up: No follow-ups on file.    Troy López MD         [1]   Allergies  Allergen Reactions    Pollen      Seasonal allergies    Tramadol UNKNOWN

## 2025-04-11 ENCOUNTER — OFFICE VISIT (OUTPATIENT)
Dept: DERMATOLOGY CLINIC | Facility: CLINIC | Age: 80
End: 2025-04-11
Payer: MEDICARE

## 2025-04-11 DIAGNOSIS — L57.0 AK (ACTINIC KERATOSIS): Primary | ICD-10-CM

## 2025-04-11 DIAGNOSIS — D23.9 BENIGN NEOPLASM OF SKIN, UNSPECIFIED LOCATION: ICD-10-CM

## 2025-04-11 DIAGNOSIS — L82.1 OTHER SEBORRHEIC KERATOSIS: ICD-10-CM

## 2025-04-11 DIAGNOSIS — Z08 ENCOUNTER FOR FOLLOW-UP SURVEILLANCE OF SKIN CANCER: ICD-10-CM

## 2025-04-11 DIAGNOSIS — L81.4 LENTIGO: ICD-10-CM

## 2025-04-11 DIAGNOSIS — Z85.828 ENCOUNTER FOR FOLLOW-UP SURVEILLANCE OF SKIN CANCER: ICD-10-CM

## 2025-04-11 DIAGNOSIS — D22.9 MULTIPLE NEVI: ICD-10-CM

## 2025-04-11 PROCEDURE — 17000 DESTRUCT PREMALG LESION: CPT | Performed by: DERMATOLOGY

## 2025-04-11 PROCEDURE — 99213 OFFICE O/P EST LOW 20 MIN: CPT | Performed by: DERMATOLOGY

## 2025-04-11 PROCEDURE — 17003 DESTRUCT PREMALG LES 2-14: CPT | Performed by: DERMATOLOGY

## 2025-04-11 NOTE — PROGRESS NOTES
The following individual(s) verbally consented to be recorded using ambient AI listening technology and understand that they can each withdraw their consent to this listening technology at any point by asking the clinician to turn off or pause the recording:    Patient name: Clay Hernandezerson

## 2025-04-17 ENCOUNTER — LAB ENCOUNTER (OUTPATIENT)
Dept: LAB | Facility: HOSPITAL | Age: 80
End: 2025-04-17
Attending: INTERNAL MEDICINE
Payer: MEDICARE

## 2025-04-17 ENCOUNTER — PATIENT MESSAGE (OUTPATIENT)
Dept: INTERNAL MEDICINE CLINIC | Facility: CLINIC | Age: 80
End: 2025-04-17

## 2025-04-17 ENCOUNTER — TELEPHONE (OUTPATIENT)
Dept: INTERNAL MEDICINE CLINIC | Facility: CLINIC | Age: 80
End: 2025-04-17

## 2025-04-17 DIAGNOSIS — R39.9 UTI SYMPTOMS: Primary | ICD-10-CM

## 2025-04-17 DIAGNOSIS — R82.90 ABNORMAL URINE SEDIMENT: Primary | ICD-10-CM

## 2025-04-17 DIAGNOSIS — R39.9 UTI SYMPTOMS: ICD-10-CM

## 2025-04-17 DIAGNOSIS — N30.00 ACUTE CYSTITIS WITHOUT HEMATURIA: ICD-10-CM

## 2025-04-17 LAB
BILIRUB UR QL: NEGATIVE
CLARITY UR: CLEAR
GLUCOSE UR-MCNC: NORMAL MG/DL
HGB UR QL STRIP.AUTO: NEGATIVE
KETONES UR-MCNC: NEGATIVE MG/DL
LEUKOCYTE ESTERASE UR QL STRIP.AUTO: NEGATIVE
NITRITE UR QL STRIP.AUTO: NEGATIVE
PH UR: 5.5 [PH] (ref 5–8)
PROT UR-MCNC: NEGATIVE MG/DL
SP GR UR STRIP: 1.02 (ref 1–1.03)
UROBILINOGEN UR STRIP-ACNC: NORMAL

## 2025-04-17 PROCEDURE — 87086 URINE CULTURE/COLONY COUNT: CPT

## 2025-04-17 PROCEDURE — 81003 URINALYSIS AUTO W/O SCOPE: CPT

## 2025-04-17 NOTE — TELEPHONE ENCOUNTER
To Dr. BARRAZA:  Spoke with patient regarding below [employer] message.     Pt will submit UA/Cx this afternoon at [facility].     UTI Symptoms:  []Frequency  [x]Urgency  [x]Pain/burning  []Blood in urine  []Low back pain  []Flank pain  []Fevers  []Chills  []Night sweats  [x]Odor  []Confusion  [x]Bladder distention  [x]Feeling that bladder isn't empty after urinating  [x]Cloudy urine      Start of symptoms: 2 wks ago    OTC meds:     Willing to get a UA/Cx:  [x]Yes   [] No    NOTES:    Reporting 1/8-3/16 in. long oval translucent \"floaters\" in his urine - only at night, none present during the day.           Clay Contreras to [facility] Clinical Staff (supporting Jeff Anthony D'Amico, DO)        4/17/25  8:29 AM  I've noticed there are grain sized floaters in my urine when I pee during the night.  Please advise.  Demetris

## 2025-04-17 NOTE — TELEPHONE ENCOUNTER
Patient called and left message to call back tomorrow morning. , please call patient Friday 4/18/25 at 8am to schedule telephone visit.

## 2025-04-17 NOTE — TELEPHONE ENCOUNTER
Good idea to collect a collect a UA CS, and I will reach out to him, lets see if he is available to speak to me tonight, nursing reach out to him now.  Keep him on the phone if possible to convert to a phone visit if not 9 AM as an add-on virtual visit is okay for tomorrow.

## 2025-04-18 ENCOUNTER — VIRTUAL PHONE E/M (OUTPATIENT)
Dept: INTERNAL MEDICINE CLINIC | Facility: CLINIC | Age: 80
End: 2025-04-18
Payer: MEDICARE

## 2025-04-18 DIAGNOSIS — R82.90 ABNORMAL URINE SEDIMENT: Primary | ICD-10-CM

## 2025-04-18 DIAGNOSIS — N30.00 ACUTE CYSTITIS WITHOUT HEMATURIA: ICD-10-CM

## 2025-04-18 PROCEDURE — G2252 BRIEF CHKIN BY MD/QHP, 11-20: HCPCS | Performed by: INTERNAL MEDICINE

## 2025-04-18 RX ORDER — NITROFURANTOIN 25; 75 MG/1; MG/1
100 CAPSULE ORAL 2 TIMES DAILY
Qty: 14 CAPSULE | Refills: 0 | Status: SHIPPED | OUTPATIENT
Start: 2025-04-18

## 2025-04-18 NOTE — PROGRESS NOTES
Virtual Visit/Telephone Note    Clay Contreras verbally consents to a Virtual/Telephone Check-In service on 25  Patient understands and accepts financial responsibility for any deductible, co-insurance and/or co-pays associated with this service.    Duration/time spent of the service: 20 Minutes of direct patient contact.  10 Minutes of chart review, documentation, medical decision making.    HPI:   Clay Contreras is a 79 year old male who presents for complains of:   Abnormal urine: Patient claims for 2 weeks now has had abnormal urine sediment in the toilet.  He claims it to be at milky clear translucent type floating material that seems to be produced early in the morning, or in the urinations that happened in the middle of the night for him.  He claims he to get up once or twice at night for baseline urination for him.  But he sees these either floating on top of the water, or slightly below the surface in the toilet, he denies any burning or problems with urination, does not admit to sexual activity, has no abnormal smell, he claims he has tried to adjust his diet over the past 2 weeks thinking it could be something he is doing he is cut out the alcohol beverages, and watch the protein intake.  He also claims with urination most days, he is seeing some more frothy type bubbles as well.  He did submit a UA yesterday, it was resulted as completely normal.  Urine culture is still pending.      Physical exam:   Telephone visit only, no obvious pain no shortness of breath, patient sounds to be in his usual state of health    ASSESSMENT AND PLAN:   Clay Contreras is a 79 year old male who presents with the followin. Abnormal urine sediment  I suspect this could be infection, producing a slime layer such as Klebsiella, or abnormal protein production.  Regardless, like the idea of trying to clear this with a mild antibiotic of nitrofurantoin 100 mg twice daily for 7 days.  This should  filter in enough concentration to be effective here, and we will wait on your urine culture over the weekend, lets see what happens, if the urine sediment does continue into next week I will need a follow-up call, or visit.  We may have to have you in with a specialist if this trend continues.  Repeating some blood work to ensure some kidney function is stable is also a good idea.  Lets look into the diet, any new changes in medications etc.    2. Acute cystitis without hematuria  Treat as above, close watch, we should take this to resolution    Jeff Anthony D'Amico,   4/18/2025  9:24 AM    Spent 30 minutes obtaining history, evaluating patient, discussing treatment options, diet, exercise, review of available labs and radiology reports, and completing documentation.

## 2025-04-20 NOTE — PROGRESS NOTES
Clay Contreras is a 79 year old male.  HPI:     CC:    Chief Complaint   Patient presents with    Full Skin Exam     LOV 24. Pt presents for FBSE.  Moles of concern on front of neck that are itching and growing in size.     Pt has hx of BCC and AK.         Allergies:  Pollen and Tramadol    HISTORY:    Past Medical History:    Actinic keratosis    left medial cheek    BCC (basal cell carcinoma)    Face - left lateral cheek    BCC (basal cell carcinoma)    Right temple    BCC (basal cell carcinoma)    Right extensor forearm    Colon carcinoma (HCC)    Family history of heart disease    Family h/o ASHD    High cholesterol    History of hydrocelectomy    Inguinal hernia    Left eye injury         Other and unspecified hyperlipidemia    Poison ivy    Skin cancer, basal cell    R chest ; L lat canthus       Past Surgical History:   Procedure Laterality Date    Chemotherapy      Colectomy      colon CA    Colonoscopy  2012, , , , 3/02, 2000    Dr. Wellington    Colonoscopy N/A 2017    Procedure: COLONOSCOPY;  Surgeon: Sergey Wellington MD;  Location: [facility] ENDOSCOPY    Egd  2008, 3/01    Dr. Wellington    Hernia surgery        Family History   Problem Relation Age of Onset    Stroke Father 80    Breast Cancer Mother 78        transitioned to ovarian       Social History     Socioeconomic History    Marital status:    Tobacco Use    Smoking status: Former     Current packs/day: 0.00     Average packs/day: 1 pack/day for 15.0 years (15.0 ttl pk-yrs)     Types: Cigarettes     Start date: 6/15/1970     Quit date: 6/15/1985     Years since quittin.8     Passive exposure: Past    Smokeless tobacco: Never   Vaping Use    Vaping status: Never Used   Substance and Sexual Activity    Alcohol use: Yes     Alcohol/week: 7.0 standard drinks of alcohol     Types: 7 Cans of beer per week     Comment: Occ    Drug use: No   Other Topics Concern    Caffeine Concern Yes     Comment:  Coffee 4 cups daily    Grew up on a farm No    History of tanning Yes    Outdoor occupation No    Pt has a pacemaker No    Pt has a defibrillator No    Reaction to local anesthetic No        Current Outpatient Medications   Medication Sig Dispense Refill    simvastatin 20 MG Oral Tab Take 0.5 tablets (10 mg total) by mouth nightly. 45 tablet 0    fluorouracil 5 % External Cream Use twice weekly at night as directed x 6 weeks (Patient taking differently: Use twice weekly at night as directed as needed) 40 g 1    Ascorbic Acid (VITAMIN C) 100 MG Oral Tab Take 1 tablet (100 mg total) by mouth daily.      fluticasone propionate 50 MCG/ACT Nasal Suspension 2 sprays by Each Nare route 2 (two) times daily. (Patient taking differently: 2 sprays by Each Nare route as needed.) 2 each 5    Cholecalciferol (VITAMIN D3) 25 MCG Oral Tab Take 1 tablet (1,000 Units total) by mouth daily.      Multiple Vitamins-Minerals (PRESERVISION AREDS) Oral Tab Take 2 tablets by mouth daily.      Glucosamine HCl-MSM (GLUCOSAMINE-MSM) 1500-500 MG/30ML Oral Liquid Take 2 tablets by mouth daily.        loratadine 10 MG Oral Tab Take 1 tablet (10 mg total) by mouth daily as needed.      Multiple Vitamin (MULTI-VITAMINS) Oral Tab Take 1 tablet by mouth daily.      Coenzyme Q10 (COQ-10) 100 MG Oral Cap Take 1 capsule by mouth daily.      nitrofurantoin monohydrate macro 100 MG Oral Cap Take 1 capsule (100 mg total) by mouth 2 (two) times daily. 14 capsule 0     Allergies:   Allergies   Allergen Reactions    Pollen      Seasonal allergies    Tramadol UNKNOWN       Past Medical History:    Actinic keratosis    left medial cheek    BCC (basal cell carcinoma)    Face - left lateral cheek    BCC (basal cell carcinoma)    Right temple    BCC (basal cell carcinoma)    Right extensor forearm    Colon carcinoma (HCC)    Family history of heart disease    Family h/o ASHD    High cholesterol    History of hydrocelectomy    Inguinal hernia    Left eye injury          Other and unspecified hyperlipidemia    Poison ivy    Skin cancer, basal cell    R chest ; L lat [facility]      Past Surgical History:   Procedure Laterality Date    Chemotherapy      Colectomy      colon CA    Colonoscopy  2012, , , , 3/02, 2000    Dr. Wellington    Colonoscopy N/A 2017    Procedure: COLONOSCOPY;  Surgeon: Sergey Wellington MD;  Location: [facility] ENDOSCOPY    Egd  2008, 3/01    Dr. Wellington    Hernia surgery       Social History     Socioeconomic History    Marital status:      Spouse name: Not on file    Number of children: Not on file    Years of education: Not on file    Highest education level: Not on file   Occupational History    Not on file   Tobacco Use    Smoking status: Former     Current packs/day: 0.00     Average packs/day: 1 pack/day for 15.0 years (15.0 ttl pk-yrs)     Types: Cigarettes     Start date: 6/15/1970     Quit date: 6/15/1985     Years since quittin.8     Passive exposure: Past    Smokeless tobacco: Never   Vaping Use    Vaping status: Never Used   Substance and Sexual Activity    Alcohol use: Yes     Alcohol/week: 7.0 standard drinks of alcohol     Types: 7 Cans of beer per week     Comment: Occ    Drug use: No    Sexual activity: Not on file   Other Topics Concern     Service Not Asked    Blood Transfusions Not Asked    Caffeine Concern Yes     Comment: Coffee 4 cups daily    Occupational Exposure Not Asked    Hobby Hazards Not Asked    Sleep Concern Not Asked    Stress Concern Not Asked    Weight Concern Not Asked    Special Diet Not Asked    Back Care Not Asked    Exercise Not Asked    Bike Helmet Not Asked    Seat Belt Not Asked    Self-Exams Not Asked    Grew up on a farm No    History of tanning Yes    Outdoor occupation No    Pt has a pacemaker No    Pt has a defibrillator No    Reaction to local anesthetic No   Social History Narrative    Not on file     Social Drivers of Health     Food Insecurity: Not on  file   Transportation Needs: Not on file   Stress: Not on file   Housing Stability: Not on file     Family History   Problem Relation Age of Onset    Stroke Father 80    Breast Cancer Mother 78        transitioned to ovarian        There were no vitals filed for this visit.    HPI:    Chief Complaint   Patient presents with    Full Skin Exam     LOV 07/24/24. Pt presents for FBSE.  Moles of concern on front of neck that are itching and growing in size.     Pt has hx of BCC and AK.     Patient here for routine follow-up AK's BCC, biopsy left cheek 1/23 hypertrophic AK and folliculitis    Patient notes previously diagnosed with a \"freckle in the back of his eye\" followed by ophthalmology.  Unchanged per patient.    BCC left cheek post Mohs surgery history of BCC right chest left lateral canthus [id number]    He has been careful to wear sunscreen and hat.       History of Present Illness  Clay Contreras is a 79 year old male who presents with multiple bothersome moles on his neck.    He has multiple moles on his neck that have become itchy and increased in number. Previously, he declined to address them, but now finds them annoying.    The moles sometimes come off in the shower when he uses a nylon scrubby, but he is concerned about further irritation.       Patient presents with concerns above.    Past notes/ records and appropriate/relevant lab results including pathology and past body maps reviewed. Updated and new information noted in current visit.       Patient has been in their usual state of health.  History, medications, allergies reviewed as noted.      ROS:  Denies any other systemic complaints.  No new or changeing lesions other than noted above. No fevers, chills, night sweats, unusual sun sensitivity.  No other skin complaints.        History, medications, allergies reviewed as noted.       Physical Examination:     Well-developed well-nourished patient alert oriented in no acute distress.   Exam total-body performed, including scalp, head, neck, face,nails, hair, external eyes, including conjunctival mucosa, eyelids, lips external ears, back, chest,/ breasts, axillae,  abdomen, arms, legs, palms.     Multiple light to medium brown, well marginated, uniformly pigmented, macules and papules 6 mm and less scattered on exam. pigmented lesions examined with dermoscopy benign-appearing patterns.     Waxy tannish keratotic papules scattered, cherry-red vascular papules scattered.    See map today's date for lesions noted .  Otherwise remarkable for lesions as noted on map.  See Assessment /Plan for additional history and physical exam also:    Assessment / plan:    No orders of the defined types were placed in this encounter.      Meds & Refills for this Visit:  Requested Prescriptions      No prescriptions requested or ordered in this encounter         Encounter Diagnoses   Name Primary?    AK (actinic keratosis) Yes    Multiple nevi     Other seborrheic keratosis     Benign neoplasm of skin, unspecified location     Lentigo     Encounter for follow-up surveillance of skin cancer        Physical Exam  SKIN: Cheek skin normal. Ear and forehead skin rough. Moles normal. Few moles on the back of the neck.    Results        Assessment & Plan  Moles on neck  Moles on his neck have become itchy and annoying, with a perceived increase in number. There are few moles on the back of the neck, with no concerning features at present. Caution is advised to prevent irritation, bleeding, and infection.  - Advise caution with nylon scrubbers in the shower to avoid irritation.  - Reassure that exfoliation is acceptable but should be moderate to prevent bleeding and infection.    Right cheek lesion  The lesion on his right cheek has flattened, indicating improvement. It resembles a previous lesion and requires monitoring. Lotion application is recommended to aid healing.  - Advise application of lotion to the right cheek  lesion and monitor for changes.    Follow-up  He is advised to return for a follow-up appointment toward the end of the year unless there are changes in his condition.  - Schedule follow-up appointment for the end of the year.    Recording duration: 8 minutes      Left medial cheek AK, folliculitis 1/23    Erythematous scaling keratotic papules noted at sites noted on map  Actinic Keratoses.  Precancerous nature discussed. Sun protection, sunscreen/ blocks encouraged Lesions treated with cryo- .  Biopsy if not resolved.    Right lateral orbit cheekx2    History of Mohs left cheek, nasal dorsum areas post fluorouracil overall doing well consider repeat careful sun protection.  Overall good results.    Plan 5% fluorouracil twice weekly for 6 weeks to right forehead nasal tip in particular darker patch.    Darker patch at left nasal sidewall cleansed patient has been spray painting.  Inadvertently area of spray paint.  No biopsy at this time    Careful sun protection while outdoors sailing.  Had both his own sailboat is now enjoying sailing outdoors a great deal encourage sun protection    Extensive lentigines, no other suspicious lesions back chest with extensive sun damage lentigines no new suspicious lesions    BCC right temple post Mohs 2021 healing well  BCC 11/21 post cautery healed well    Patient with BCC left lateral cheek post Mohs.  With Dr. Brambila hypertrophic scarring has improved treat with intralesional Kenalog    Extensive benign seborrheic keratosis her shoulders upper back numerous lentigines  Waxy tan keratotic papules lesions in areas of concern as noted reassurance given.  Benign nature discussed.  Possibility of cryo, alphahydroxy acids over-the-counter retinol's discussed.    Nails with onychomycotic changes   Continue over-the-counter products     patient concerned that cryo had caused BCC to spread  Darker lentigo right distal forearm prior biopsy BCC near observe carefully    History of  Efudex, right anterior scalp.  Improved consider repeat course    Right eyelid lesion waxy tan papule likely benign keratosis continue careful monitoring follow-up with ophthalmology for freckle on the back of the eye.  Oculoplastics plastics if necessary.  See ophthalmology regarding lesions along the eyelash margin    Nevi unchanged.  Extensive lesions no new suspicious lesions.    AK's discussed maintenance Efudex therapy once every 1-2 weeks.  And as needed ask 2-3 weeks to individual spots overall this has been helpful.  Refill if needed continue as needed.  Significant improvement.  Will repeat course of Efudex to forehead temples nasal dorsum after the holidays continue careful sun protection.  Continue Efudex      Lesion at left knee darker brown macule longstanding unchanged    No recurrence of previous atypical nevi    No other susupicious lesions on todays  exam.  Please refer to map for specific lesions.  See additional diagnoses.  Pros cons of various therapies, risks benefits discussed.Pathophysiology discussed with patient.  Therapeutic options reviewed.  See  Medications in grid.  Instructions reviewed at length.    Benign nevi, seborrheic  keratoses, cherry angiomas:  Reassurance regarding other benign skin lesions.    Monitor for new or changing lesions. Signs and symptoms of skin cancer, ABCDE's of melanoma ( additional information available at AAD.org, skincancer.org) Encourage Sunscreen (broad-spectrum, ideally mineral-based-UVA/UVB -SPF 30 or higher) use encouraged, sun protection/sun protective clothing, self exams reviewed Followup as noted RTC ---routine checkup 6 mos -one year or p.r.n.    Encounter Times   Including precharting, reviewing chart, prior notes obtaining history: 10 minutes, medical exam :10 minutes, notes on body map, plan, counseling 10minutes My total time spent caring for the patient on the day of the encounter: 30 minutes     The patient indicates understanding of these  issues and agrees to the plan.  The patient is asked to return as noted in follow-up/ above.    This note was generated using Dragon voice recognition software.  Please contact me regarding any confusion resulting from errors in recognition..  Note to patient and family: The 21st Century Cures Act makes medical notes like these available to patients. However, be advised this is a medical document. It is intended as [id number] communication and monitoring of a patient's care needs. It is written in medical language and may contain abbreviations or verbiage that are unfamiliar. It may appear blunt or direct. Medical documents are intended to carry relevant information, facts as evident and the clinical opinion of the practitioner.

## 2025-04-30 RX ORDER — SIMVASTATIN 20 MG
10 TABLET ORAL NIGHTLY
Qty: 45 TABLET | Refills: 3 | Status: SHIPPED | OUTPATIENT
Start: 2025-04-30

## 2025-04-30 NOTE — TELEPHONE ENCOUNTER
Refill request is for a maintenance medication and has met the criteria specified in the Ambulatory Medication Refill Standing Order for eligibility, visits, laboratory, alerts and was sent to the requested pharmacy.    Requested Prescriptions     Signed Prescriptions Disp Refills    simvastatin 20 MG Oral Tab 45 tablet 3     Sig: TAKE 1/2 TABLET(10 MG) BY MOUTH EVERY NIGHT     Authorizing Provider: D'AMICO, JEFF ANTHONY     Ordering User: JUDY BOONE

## 2025-07-23 ENCOUNTER — TELEPHONE (OUTPATIENT)
Age: 80
End: 2025-07-23

## 2025-07-23 DIAGNOSIS — R97.20 ELEVATED PSA: Primary | ICD-10-CM

## 2025-07-30 ENCOUNTER — LAB ENCOUNTER (OUTPATIENT)
Dept: LAB | Facility: HOSPITAL | Age: 80
End: 2025-07-30
Attending: INTERNAL MEDICINE

## 2025-07-30 DIAGNOSIS — R97.20 ELEVATED PSA: Primary | ICD-10-CM

## 2025-07-30 LAB — PSA SERPL-MCNC: 2.22 NG/ML (ref ?–4)

## 2025-07-30 PROCEDURE — 84153 ASSAY OF PSA TOTAL: CPT

## 2025-07-30 PROCEDURE — 36415 COLL VENOUS BLD VENIPUNCTURE: CPT

## 2025-08-22 SDOH — ECONOMIC STABILITY: TRANSPORTATION INSECURITY
IN THE PAST 12 MONTHS, HAS LACK OF RELIABLE TRANSPORTATION KEPT YOU FROM MEDICAL APPOINTMENTS, MEETINGS, WORK OR FROM GETTING THINGS NEEDED FOR DAILY LIVING?: NO

## 2025-08-22 SDOH — ECONOMIC STABILITY: FOOD INSECURITY: WITHIN THE PAST 12 MONTHS, THE FOOD YOU BOUGHT JUST DIDN'T LAST AND YOU DIDN'T HAVE MONEY TO GET MORE.: NEVER TRUE

## 2025-08-22 SDOH — ECONOMIC STABILITY: HOUSING INSECURITY: WHAT IS YOUR LIVING SITUATION TODAY?: I HAVE A STEADY PLACE TO LIVE

## 2025-08-22 SDOH — ECONOMIC STABILITY: HOUSING INSECURITY: DO YOU HAVE PROBLEMS WITH ANY OF THE FOLLOWING?: NONE OF THE ABOVE

## 2025-08-22 ASSESSMENT — SOCIAL DETERMINANTS OF HEALTH (SDOH): IN THE PAST 12 MONTHS, HAS THE ELECTRIC, GAS, OIL, OR WATER COMPANY THREATENED TO SHUT OFF SERVICE IN YOUR HOME?: NO

## 2025-08-25 ENCOUNTER — APPOINTMENT (OUTPATIENT)
Age: 80
End: 2025-08-25

## 2025-08-25 VITALS
OXYGEN SATURATION: 97 % | TEMPERATURE: 98.2 F | SYSTOLIC BLOOD PRESSURE: 139 MMHG | DIASTOLIC BLOOD PRESSURE: 89 MMHG | HEIGHT: 76 IN | WEIGHT: 205.69 LBS | BODY MASS INDEX: 25.05 KG/M2 | HEART RATE: 71 BPM

## 2025-08-25 DIAGNOSIS — Z12.5 SCREENING PSA (PROSTATE SPECIFIC ANTIGEN): ICD-10-CM

## 2025-08-25 DIAGNOSIS — R39.15 URINARY URGENCY: ICD-10-CM

## 2025-08-25 DIAGNOSIS — R82.90 ABNORMAL URINE SEDIMENT: Primary | ICD-10-CM

## 2025-08-25 RX ORDER — UBIDECARENONE 100 MG
CAPSULE ORAL
COMMUNITY

## 2025-08-25 RX ORDER — FLUTICASONE PROPIONATE 50 MCG
SPRAY, SUSPENSION (ML) NASAL
COMMUNITY

## 2025-08-25 RX ORDER — NITROFURANTOIN 25; 75 MG/1; MG/1
100 CAPSULE ORAL
COMMUNITY
Start: 2025-04-18

## 2025-08-25 RX ORDER — LORATADINE 10 MG/1
10 TABLET ORAL DAILY
COMMUNITY

## 2025-08-25 RX ORDER — SIMVASTATIN 20 MG
10 TABLET ORAL
COMMUNITY
Start: 2025-04-30

## 2025-08-25 RX ORDER — FLUOROURACIL 50 MG/G
CREAM TOPICAL 2 TIMES DAILY
COMMUNITY

## 2025-08-25 ASSESSMENT — PATIENT HEALTH QUESTIONNAIRE - PHQ9
SUM OF ALL RESPONSES TO PHQ9 QUESTIONS 1 AND 2: 0
2. FEELING DOWN, DEPRESSED OR HOPELESS: NOT AT ALL
1. LITTLE INTEREST OR PLEASURE IN DOING THINGS: NOT AT ALL
SUM OF ALL RESPONSES TO PHQ9 QUESTIONS 1 AND 2: 0

## (undated) DIAGNOSIS — G89.29 CHRONIC BILATERAL THORACIC BACK PAIN: Primary | ICD-10-CM

## (undated) DIAGNOSIS — N39.41 URGE INCONTINENCE: ICD-10-CM

## (undated) DIAGNOSIS — M54.6 CHRONIC BILATERAL THORACIC BACK PAIN: Primary | ICD-10-CM

## (undated) DEVICE — ENDOSCOPY PACK UPPER: Brand: MEDLINE INDUSTRIES, INC.

## (undated) DEVICE — ENDOSCOPY PACK - LOWER: Brand: MEDLINE INDUSTRIES, INC.

## (undated) DEVICE — Device: Brand: DEFENDO AIR/WATER/SUCTION AND BIOPSY VALVE

## (undated) NOTE — MR AVS SNAPSHOT
[address]  [address] [address]  [phone]               Thank you for choosing us for your health care visit with Nicole Ross MD.  We are glad to serve you and happy to provide you with this summary of For medical emergencies, dial 911.            Visit [facility] online at  [email or web address]

## (undated) NOTE — LETTER
AUTHORIZATION FOR SURGICAL OPERATION OR OTHER PROCEDURE    1. I hereby authorize Dr. López/ Rowena Sharma PA-C, and [facility] staff assigned to my case to perform the following operation and/or procedure at the [facility] site:    _______________________________________________________________________________________________    Cortisone injection to Right Shoulder  _______________________________________________________________________________________________    2.  My physician has explained the nature and purpose of the operation or other procedure, possible alternative methods of treatment, the risks involved, and the possibility of complication to me.  I acknowledge that no guarantee has been made as to the result that may be obtained.  3.  I recognize that, during the course of this operation, or other procedure, unforseen conditions may necessitate additional or different procedure than those listed above.  I, therefore, further authorize and request that the above named physician, his/her physician assistants or designees perform such procedures as are, in his/her professional opinion, necessary and desirable.  4.  Any tissue or organs removed in the operation or other procedure may be disposed of by and at the discretion of the [facility] and [facility].  5.  I understand that in the event of a medical emergency, I will be transported by local paramedics to [facility] or other hospital emergency department.  6.  I certify that I have read and fully understand the above consent to operation and/or other procedure.    7.  I acknowledge that my physician has explained sedation/analgesia administration to me including the risks and benefits.  I consent to the administration of sedation/analgesia as may be necessary or desirable in the judgement of my physician.    Witness signature: ___________________________________________________ Date:   ______/______/_____                    Time:  ________ A.M.  P.M.       Patient Name:  ______________________________________________________  (please print)      Patient signature:  ___________________________________________________             Relationship to Patient:           []  Parent    Responsible person                          []  Spouse  In case of minor or                    [] Other  _____________   Incompetent name:  __________________________________________________                               (please print)      _____________      Responsible person  In case of minor or  Incompetent signature:  _______________________________________________    Statement of Physician  My signature below affirms that prior to the time of the procedure, I have explained to the patient and/or his/her guardian, the risks and benefits involved in the proposed treatment and any reasonable alternative to the proposed treatment.  I have also explained the risks and benefits involved in the refusal of the proposed treatment and have answered the patient's questions.                        Date:  ______/______/_______  Provider                      Signature:  __________________________________________________________       Time:  ___________ A.M    P.M.

## (undated) NOTE — LETTER
AUTHORIZATION FOR SURGICAL OPERATION OR OTHER PROCEDURE    1. I hereby authorize Dr. López, and [facility] staff assigned to my case to perform the following operation and/or procedure at the [facility] site:    _______________________________________________________________________________________________    Right shoulder cortisone injection  _______________________________________________________________________________________________    2.  My physician has explained the nature and purpose of the operation or other procedure, possible alternative methods of treatment, the risks involved, and the possibility of complication to me.  I acknowledge that no guarantee has been made as to the result that may be obtained.  3.  I recognize that, during the course of this operation, or other procedure, unforseen conditions may necessitate additional or different procedure than those listed above.  I, therefore, further authorize and request that the above named physician, his/her physician assistants or designees perform such procedures as are, in his/her professional opinion, necessary and desirable.  4.  Any tissue or organs removed in the operation or other procedure may be disposed of by and at the discretion of the [facility] and [facility].  5.  I understand that in the event of a medical emergency, I will be transported by local paramedics to [facility] or other hospital emergency department.  6.  I certify that I have read and fully understand the above consent to operation and/or other procedure.    7.  I acknowledge that my physician has explained sedation/analgesia administration to me including the risks and benefits.  I consent to the administration of sedation/analgesia as may be necessary or desirable in the judgement of my physician.    Witness signature: ___________________________________________________ Date:   ______/______/_____                    Time:  ________ A.M.  P.M.       Patient Name:  ______________________________________________________  (please print)      Patient signature:  ___________________________________________________             Relationship to Patient:           []  Parent    Responsible person                          []  Spouse  In case of minor or                    [] Other  _____________   Incompetent name:  __________________________________________________                               (please print)      _____________      Responsible person  In case of minor or  Incompetent signature:  _______________________________________________    Statement of Physician  My signature below affirms that prior to the time of the procedure, I have explained to the patient and/or his/her guardian, the risks and benefits involved in the proposed treatment and any reasonable alternative to the proposed treatment.  I have also explained the risks and benefits involved in the refusal of the proposed treatment and have answered the patient's questions.                        Date:  ______/______/_______  Provider                      Signature:  __________________________________________________________       Time:  ___________ A.M    P.M.

## (undated) NOTE — LETTER
AUTHORIZATION FOR SURGICAL OPERATION OR OTHER PROCEDURE    1. I hereby authorize Dr. Sajan Armstrong, and [place] [facility] [place] [facility] staff assigned to my case to perform the following operation and/or procedure at the [place] [facility] [place], [facility]:    _______________________________________________________________________________________________      _______________________________________________________________________________________________    2. My physician has explained the nature and purpose of the operation or other procedure, possible alternative methods of treatment, the risks involved, and the possibility of complication to me. I acknowledge that no guarantee has been made as to the result that may be obtained. 3.  I recognize that, during the course of this operation, or other procedure, unforseen conditions may necessitate additional or different procedure than those listed above. I, therefore, further authorize and request that the above named physician, his/her physician assistants or designees perform such procedures as are, in his/her professional opinion, necessary and desirable. 4.  Any tissue or organs removed in the operation or other procedure may be disposed of by and at the discretion of the [place] [facility] [place], [facility] and [facility] AT [facility]. 5.  I understand that in the event of a medical emergency, I will be transported by local paramedics to [facility] or other [facility] emergency department. 6.  I certify that I have read and fully understand the above consent to operation and/or other procedure. 7.  I acknowledge that my physician has explained sedation/analgesia administration to me including the risks and benefits. I consent to the administration of sedation/analgesia as may be necessary or desirable in the judgement of my physician. Witness signature: ___________________________________________________ Date:  ______/______/_____                    Time:  ________ A. M. P.M.       Patient Name:  ______________________________________________________  (please print)      Patient signature:  ___________________________________________________             Relationship to Patient:           []  Parent    Responsible person                          []  Spouse  In case of minor or                    [] Other  _____________   Incompetent name:  __________________________________________________                               (please print)      _____________      Responsible person  In case of minor or  Incompetent signature:  _______________________________________________    Statement of Physician  My signature below affirms that prior to the time of the procedure, I have explained to the patient and/or his/her guardian, the risks and benefits involved in the proposed treatment and any reasonable alternative to the proposed treatment. I have also explained the risks and benefits involved in the refusal of the proposed treatment and have answered the patient's questions.                         Date:  ______/______/_______  Provider                      Signature:  __________________________________________________________       Time:  ___________ A.M    P.M.

## (undated) NOTE — LETTER
AUTHORIZATION FOR SURGICAL OPERATION OR OTHER PROCEDURE    1. I hereby authorize Dr. Zakiya Georges PA-C and [place] [facility] staff assigned to my case to perform the following operation and/or procedure at the [place] [address] [place], [facility]:    _______________________________________________________________________________________________    Cortisone Injection to Right shoulder  _______________________________________________________________________________________________    2. My physician has explained the nature and purpose of the operation or other procedure, possible alternative methods of treatment, the risks involved, and the possibility of complication to me. I acknowledge that no guarantee has been made as to the result that may be obtained. 3.  I recognize that, during the course of this operation, or other procedure, unforseen conditions may necessitate additional or different procedure than those listed above. I, therefore, further authorize and request that the above named physician, his/her physician assistants or designees perform such procedures as are, in his/her professional opinion, necessary and desirable. 4.  Any tissue or organs removed in the operation or other procedure may be disposed of by and at the discretion of the [facility] and [facility] AT [facility]. 5.  I understand that in the event of a medical emergency, I will be transported by local paramedics to [facility] or other hospital emergency department. 6.  I certify that I have read and fully understand the above consent to operation and/or other procedure. 7.  I acknowledge that my physician has explained sedation/analgesia administration to me including the risks and benefits. I consent to the administration of sedation/analgesia as may be necessary or desirable in the judgement of my physician.     Witness signature: ___________________________________________________ Date: ______/______/_____                    Time:  ________ A. M.  P.M. Patient Name:  ______________________________________________________  (please print)      Patient signature:  ___________________________________________________             Relationship to Patient:           []  Parent    Responsible person                          []  Spouse  In case of minor or                    [] Other  _____________   Incompetent name:  __________________________________________________                               (please print)      _____________      Responsible person  In case of minor or  Incompetent signature:  _______________________________________________    Statement of Physician  My signature below affirms that prior to the time of the procedure, I have explained to the patient and/or his/her guardian, the risks and benefits involved in the proposed treatment and any reasonable alternative to the proposed treatment. I have also explained the risks and benefits involved in the refusal of the proposed treatment and have answered the patient's questions.                         Date:  ______/______/_______  Provider                      Signature:  __________________________________________________________       Time:  ___________ A.M    P.M.